# Patient Record
Sex: MALE | Race: BLACK OR AFRICAN AMERICAN | Employment: STUDENT | URBAN - METROPOLITAN AREA
[De-identification: names, ages, dates, MRNs, and addresses within clinical notes are randomized per-mention and may not be internally consistent; named-entity substitution may affect disease eponyms.]

---
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
## 2021-03-10 ENCOUNTER — TELEPHONE (OUTPATIENT)
Dept: FAMILY MEDICINE CLINIC | Facility: CLINIC | Age: 21
End: 2021-03-10

## 2021-03-10 NOTE — TELEPHONE ENCOUNTER
Please call the patient and schedule a routine physical/health maintenance visit  The patient's last physical was 9/2019

## 2021-03-11 ENCOUNTER — TELEPHONE (OUTPATIENT)
Dept: FAMILY MEDICINE CLINIC | Facility: CLINIC | Age: 21
End: 2021-03-11

## 2023-05-23 ENCOUNTER — HOSPITAL ENCOUNTER (EMERGENCY)
Facility: HOSPITAL | Age: 23
Discharge: HOME | End: 2023-05-24
Attending: EMERGENCY MEDICINE

## 2023-05-23 DIAGNOSIS — Z91.14 NON COMPLIANCE W MEDICATION REGIMEN: ICD-10-CM

## 2023-05-23 DIAGNOSIS — F29 PSYCHOSIS (HCC): ICD-10-CM

## 2023-05-23 DIAGNOSIS — R45.1 AGITATION: Primary | ICD-10-CM

## 2023-05-23 RX ORDER — BENZTROPINE MESYLATE 1 MG/1
1 TABLET ORAL ONCE
Status: DISCONTINUED | OUTPATIENT
Start: 2023-05-24 | End: 2023-05-23

## 2023-05-23 RX ORDER — HALOPERIDOL 5 MG/ML
10 INJECTION INTRAMUSCULAR ONCE
Status: COMPLETED | OUTPATIENT
Start: 2023-05-24 | End: 2023-05-23

## 2023-05-23 RX ORDER — LORAZEPAM 2 MG/ML
2 INJECTION INTRAMUSCULAR ONCE
Status: COMPLETED | OUTPATIENT
Start: 2023-05-24 | End: 2023-05-23

## 2023-05-23 RX ORDER — HALOPERIDOL 5 MG/1
5 TABLET ORAL ONCE
Status: DISCONTINUED | OUTPATIENT
Start: 2023-05-24 | End: 2023-05-23

## 2023-05-23 RX ORDER — BENZTROPINE MESYLATE 1 MG/ML
2 INJECTION INTRAMUSCULAR; INTRAVENOUS ONCE
Status: COMPLETED | OUTPATIENT
Start: 2023-05-24 | End: 2023-05-23

## 2023-05-23 RX ORDER — LORAZEPAM 1 MG/1
2 TABLET ORAL ONCE
Status: DISCONTINUED | OUTPATIENT
Start: 2023-05-24 | End: 2023-05-23

## 2023-05-23 RX ADMIN — LORAZEPAM 2 MG: 2 INJECTION INTRAMUSCULAR; INTRAVENOUS at 23:58

## 2023-05-23 RX ADMIN — BENZTROPINE MESYLATE 2 MG: 1 INJECTION INTRAMUSCULAR; INTRAVENOUS at 23:58

## 2023-05-23 RX ADMIN — HALOPERIDOL LACTATE 10 MG: 5 INJECTION, SOLUTION INTRAMUSCULAR at 23:58

## 2023-05-24 VITALS
DIASTOLIC BLOOD PRESSURE: 66 MMHG | OXYGEN SATURATION: 98 % | BODY MASS INDEX: 22.87 KG/M2 | WEIGHT: 203 LBS | RESPIRATION RATE: 16 BRPM | TEMPERATURE: 98.9 F | HEART RATE: 72 BPM | SYSTOLIC BLOOD PRESSURE: 121 MMHG

## 2023-05-24 NOTE — ED PROVIDER NOTES
"History  Chief Complaint   Patient presents with   • Psychiatric Evaluation     Patient reports history of schizophrenia  States that he was recently released from senior care and is seeking psychiatric evaluation to have medication regulated  Denies SI, HI, AH and VH  20-year-old gentleman presents for psychiatric evaluation  He reports that he just got out of senior care and was supposed to go to a psychiatric facility  He states that he has a history of schizophrenia and schizoaffective disorder and has been off of his medicine for \"a long time\"  He states that while in senior care he was on meds but they were not effective  He denies any thoughts of self-harm or suicide but does appear to be suffering from hallucinations  Patient is not able to focus on a conversation and is easily distracted  He becomes somewhat agitated during questioning        Psychiatric Evaluation  Presenting symptoms: bizarre behavior, delusional, disorganized speech, disorganized thought process, hallucinations and paranoid behavior    Presenting symptoms: no suicidal thoughts    Degree of incapacity (severity):  Severe  Onset quality:  Gradual  Timing:  Constant  Progression:  Worsening  Chronicity:  Recurrent  Context: noncompliance    Treatment compliance:  Untreated  Relieved by:  Nothing  Worsened by:  Nothing  Ineffective treatments:  None tried  Associated symptoms: irritability and poor judgment        None       Past Medical History:   Diagnosis Date   • Acne     Last assessed 8/14/2014    • Anemia    • Anxiety    • Asthma     Last assessed 8/14/2014    • Attention deficit hyperactivity disorder     Last assessed 8/14/2014    • Depression     Last assessed 8/14/2014    • Enuresis, nocturnal only     Last assessed 8/14/2014    • Mood swings    • Oppositional defiant disorder, mild     Last assessed 8/14/2014        Past Surgical History:   Procedure Laterality Date   • NO PAST SURGERIES         Family History   Problem Relation Age of " Onset   • No Known Problems Mother    • Cancer Other      I have reviewed and agree with the history as documented  E-Cigarette/Vaping     E-Cigarette/Vaping Substances     Social History     Tobacco Use   • Smoking status: Every Day     Types: Cigarettes   • Smokeless tobacco: Never   Substance Use Topics   • Alcohol use: Yes   • Drug use: Yes     Types: Marijuana       Review of Systems   Constitutional: Positive for irritability  Psychiatric/Behavioral: Positive for hallucinations and paranoia  Negative for suicidal ideas  All other systems reviewed and are negative  Physical Exam  Physical Exam  Vitals and nursing note reviewed  Constitutional:       General: He is not in acute distress  Appearance: Normal appearance  He is well-developed  He is not ill-appearing, toxic-appearing or diaphoretic  HENT:      Head: Normocephalic and atraumatic  Right Ear: External ear normal       Left Ear: External ear normal       Nose: Nose normal       Mouth/Throat:      Mouth: Mucous membranes are moist       Pharynx: Oropharynx is clear  Eyes:      Conjunctiva/sclera: Conjunctivae normal       Pupils: Pupils are equal, round, and reactive to light  Cardiovascular:      Rate and Rhythm: Normal rate and regular rhythm  Heart sounds: Normal heart sounds  Pulmonary:      Effort: Pulmonary effort is normal  No respiratory distress  Breath sounds: Normal breath sounds  Abdominal:      General: Bowel sounds are normal  There is no distension  Palpations: Abdomen is soft  Tenderness: There is no abdominal tenderness  There is no guarding  Musculoskeletal:         General: Normal range of motion  Cervical back: Neck supple  No rigidity  Right lower leg: No edema  Left lower leg: No edema  Skin:     General: Skin is warm and dry  Capillary Refill: Capillary refill takes less than 2 seconds  Neurological:      General: No focal deficit present        Mental Status: He is alert and oriented to person, place, and time  Psychiatric:         Mood and Affect: Mood normal  Affect is inappropriate  Speech: Speech is delayed  Behavior: Behavior is agitated  Behavior is cooperative  Thought Content: Thought content does not include homicidal or suicidal ideation  Judgment: Judgment is impulsive and inappropriate  Vital Signs  ED Triage Vitals [05/23/23 2347]   Temperature Pulse Respirations Blood Pressure SpO2   98 9 °F (37 2 °C) (!) 138 18 139/80 97 %      Temp Source Heart Rate Source Patient Position - Orthostatic VS BP Location FiO2 (%)   Tympanic Monitor Sitting Left arm --      Pain Score       No Pain           Vitals:    05/23/23 2347 05/24/23 0327   BP: 139/80 121/66   Pulse: (!) 138 72   Patient Position - Orthostatic VS: Sitting Lying         Visual Acuity      ED Medications  Medications   haloperidol lactate (HALDOL) injection 10 mg (10 mg Intramuscular Given 5/23/23 2358)   LORazepam (ATIVAN) injection 2 mg (2 mg Intramuscular Given 5/23/23 2358)   benztropine (COGENTIN) injection 2 mg (2 mg Intramuscular Given 5/23/23 2358)       Diagnostic Studies  Results Reviewed     Procedure Component Value Units Date/Time    Rapid drug screen, urine [[id number]]     Lab Status: No result Specimen: Urine     POCT alcohol breath test [[id number]]     Lab Status: No result                  No orders to display              Procedures  Procedures         ED Course  ED Course as of 05/24/23 0548   Tue May 23, 2023   2358 Patient has become increasingly agitated  He is pacing around the room, shadow boxing, doing crunches, and attempting to ride on the litter like a surfboard  Patient was offered p o  medications which she refused  Security was notified and IM medications were administered  We will continue to monitor closely                                 SBIRT 20yo+    Flowsheet Row Most Recent Value   Initial Alcohol Screen: US AUDIT-C     1  How often do you have a drink containing alcohol? 0 Filed at: 05/24/2023 0216   2  How many drinks containing alcohol do you have on a typical day you are drinking? 0 Filed at: 05/24/2023 0216   3a  Male UNDER 65: How often do you have five or more drinks on one occasion? 0 Filed at: 05/24/2023 0216   3b  FEMALE Any Age, or MALE 65+: How often do you have 4 or more drinks on one occassion? 0 Filed at: 05/24/2023 0216   Audit-C Score 0 Filed at: 05/24/2023 9819   NELLY: How many times in the past year have you    Used an illegal drug or used a prescription medication for non-medical reasons? Never Filed at: 05/24/2023 0216                    Medical Decision Making  21 old gentleman presents with complaint of needing to be admitted psychiatrically  He states he has a history of schizophrenia and is not on his medications  He is unable to provide a clear and appropriate history  He has not been compliant with his medications and in the department began to have escalating behavior placing himself and staff members at risk  Behavior improved after medications are administered  I discussed the case with the crisis worker  Because of the patient's somnolence after receiving medications, he was unable to be assessed during the overnight shift  Will sign out to the day team to assess and follow-up  Agitation: acute illness or injury  Non compliance w medication regimen: chronic illness or injury  Amount and/or Complexity of Data Reviewed  Independent Historian: EMS  Labs: ordered  Risk  Prescription drug management  Decision regarding hospitalization            Disposition  Final diagnoses:   Agitation   Psychosis ([facility] [address] )   Non compliance w medication regimen     Time reflects when diagnosis was documented in both MDM as applicable and the Disposition within this note     Time User Action Codes Description Comment    5/24/2023  5:46 AM Zita Camejo Add [R45 1] Agitation     5/24/2023  5:46 AM Traci Villareal Add [F29] Psychosis ([facility] [address] )     5/24/2023  5:46 AM Traci Villareal Add [W87 262] Non compliance w medication regimen       ED Disposition     ED Disposition   Transfer to [address] [place] [address]   --    Date/Time   Tue May 23, 2023 11:48 PM    Comment   Zeinab Nevarez should be transferred out to behavioral health and has been medically cleared  Follow-up Information    None         Patient's Medications    No medications on file       No discharge procedures on file      PDMP Review     None          ED Provider  Electronically Signed by           Kwasi Baca DO  05/24/23 6041

## 2023-05-24 NOTE — ED NOTES
Pt sleeping, no distress noted  Respirations even/unlabored    1:1 obs continues for safety     Cecy Larios RN  05/24/23 0700

## 2023-05-24 NOTE — ED CARE HANDOFF
Emergency Department Sign Out Note        Sign out and transfer of care from Dr Deborah Cortez  See Separate Emergency Department note  The patient, Patrizia Hinojosa, was evaluated by the previous provider for Dr Deborah Cortez  Workup Completed:  Medically cleared    ED Course / Workup Pending (followup): After crisis eval, will discharge                                     Procedures  MDM        Disposition  Final diagnoses:   Agitation   Psychosis ([address] )   Non compliance w medication regimen     Time reflects when diagnosis was documented in both MDM as applicable and the Disposition within this note     Time User Action Codes Description Comment    5/24/2023  5:46 AM Amando Romberg Add [R45 1] Agitation     5/24/2023  5:46 AM Amando Romberg Add [F29] Psychosis ([address] )     5/24/2023  5:46 AM Amando Romberg Add [Z91 148] Non compliance w medication regimen       ED Disposition     ED Disposition   Discharge    Condition   Stable    Date/Time   Wed May 24, 2023  8:00 AM    Comment   Patrizia Hinojosa will be discharged         Follow-up Information     Follow up With Specialties Details Why [id number] , MD Family Medicine   [facility]  [address] [place] [address] [address] [facility]   Monday Tuesday Thursday 9:00 a m  to 11:30 a  m  Wednesday 9:00 a m  to 11:30 a m  and 1:00 p m  to 3:00 p m  Phone number [phone]  To be seen during walk-in hours bring for identification as well as social security card or adressed mail [facility]  [phone]        Patient's Medications    No medications on file     No discharge procedures on file         ED Provider  Electronically Signed by     Cindy Archuleta MD  05/24/23 0960

## 2023-05-24 NOTE — ED NOTES
Patient is awake and alert  Patient reported a history of schizophrenia  EMR indicates a history of depression, PTSD and substance use  He has had extended periods of noncompliance with treatment and medication  He stated he was treated during a recent incarceration and that the medication did not work  Therefore, he requested a medication adjustment  He later stated he would like to leave  Patient denied SI and HI  He denied auditory and visual hallucinations  He did present as guarded and paranoid  Criteria for a 302 were not met  Patient was encouraged to return if his symptoms became worse  He was also encouraged to utilize the walk-in hours for [address] and Referral, as his current [facility] is managed by [facility]  He will need to decide if he is going to remain in [facility] [place], and if so, will need to have his insurance transferred from [place]

## 2023-05-24 NOTE — ED NOTES
All belongings returned to pt upon discharge, paperwork signed by pt     Velvet Gtz, KARRI  05/24/23 0605

## 2023-05-24 NOTE — ED NOTES
[place] PROMISe indicates: Active  Recipient #[id number]  [facility] managed by [facility]  IV intact

## 2023-07-20 ENCOUNTER — INPATIENT (INPATIENT)
Facility: HOSPITAL | Age: 23
LOS: 18 days | Discharge: NURSING, REHAB OR LONG-TERM CARE | DRG: 885 | End: 2023-08-08
Attending: PSYCHIATRY & NEUROLOGY | Admitting: PSYCHIATRY & NEUROLOGY
Payer: COMMERCIAL

## 2023-07-20 VITALS
HEIGHT: 72 IN | WEIGHT: 199.96 LBS | RESPIRATION RATE: 16 BRPM | SYSTOLIC BLOOD PRESSURE: 116 MMHG | HEART RATE: 75 BPM | DIASTOLIC BLOOD PRESSURE: 80 MMHG | TEMPERATURE: 98 F | OXYGEN SATURATION: 99 %

## 2023-07-20 DIAGNOSIS — F29 UNSPECIFIED PSYCHOSIS NOT DUE TO A SUBSTANCE OR KNOWN PHYSIOLOGICAL CONDITION: ICD-10-CM

## 2023-07-20 LAB
ANION GAP SERPL CALC-SCNC: 15 MMOL/L — SIGNIFICANT CHANGE UP (ref 5–17)
APAP SERPL-MCNC: <5 UG/ML — LOW (ref 10–30)
BASOPHILS # BLD AUTO: 0.04 K/UL — SIGNIFICANT CHANGE UP (ref 0–0.2)
BASOPHILS NFR BLD AUTO: 0.4 % — SIGNIFICANT CHANGE UP (ref 0–2)
BUN SERPL-MCNC: 15 MG/DL — SIGNIFICANT CHANGE UP (ref 7–23)
CALCIUM SERPL-MCNC: 9.6 MG/DL — SIGNIFICANT CHANGE UP (ref 8.4–10.5)
CHLORIDE SERPL-SCNC: 102 MMOL/L — SIGNIFICANT CHANGE UP (ref 96–108)
CO2 SERPL-SCNC: 21 MMOL/L — LOW (ref 22–31)
CREAT SERPL-MCNC: 1.06 MG/DL — SIGNIFICANT CHANGE UP (ref 0.5–1.3)
EGFR: 101 ML/MIN/1.73M2 — SIGNIFICANT CHANGE UP
EOSINOPHIL # BLD AUTO: 0.04 K/UL — SIGNIFICANT CHANGE UP (ref 0–0.5)
EOSINOPHIL NFR BLD AUTO: 0.4 % — SIGNIFICANT CHANGE UP (ref 0–6)
ETHANOL SERPL-MCNC: <10 MG/DL — SIGNIFICANT CHANGE UP (ref 0–10)
GLUCOSE SERPL-MCNC: 83 MG/DL — SIGNIFICANT CHANGE UP (ref 70–99)
HCT VFR BLD CALC: 45.3 % — SIGNIFICANT CHANGE UP (ref 39–50)
HGB BLD-MCNC: 15.4 G/DL — SIGNIFICANT CHANGE UP (ref 13–17)
IMM GRANULOCYTES NFR BLD AUTO: 0.3 % — SIGNIFICANT CHANGE UP (ref 0–0.9)
LYMPHOCYTES # BLD AUTO: 1.66 K/UL — SIGNIFICANT CHANGE UP (ref 1–3.3)
LYMPHOCYTES # BLD AUTO: 17.2 % — SIGNIFICANT CHANGE UP (ref 13–44)
MCHC RBC-ENTMCNC: 27.1 PG — SIGNIFICANT CHANGE UP (ref 27–34)
MCHC RBC-ENTMCNC: 34 GM/DL — SIGNIFICANT CHANGE UP (ref 32–36)
MCV RBC AUTO: 79.6 FL — LOW (ref 80–100)
MONOCYTES # BLD AUTO: 0.85 K/UL — SIGNIFICANT CHANGE UP (ref 0–0.9)
MONOCYTES NFR BLD AUTO: 8.8 % — SIGNIFICANT CHANGE UP (ref 2–14)
NEUTROPHILS # BLD AUTO: 7.03 K/UL — SIGNIFICANT CHANGE UP (ref 1.8–7.4)
NEUTROPHILS NFR BLD AUTO: 72.9 % — SIGNIFICANT CHANGE UP (ref 43–77)
NRBC # BLD: 0 /100 WBCS — SIGNIFICANT CHANGE UP (ref 0–0)
PLATELET # BLD AUTO: 222 K/UL — SIGNIFICANT CHANGE UP (ref 150–400)
POTASSIUM SERPL-MCNC: 4.3 MMOL/L — SIGNIFICANT CHANGE UP (ref 3.5–5.3)
POTASSIUM SERPL-SCNC: 4.3 MMOL/L — SIGNIFICANT CHANGE UP (ref 3.5–5.3)
RBC # BLD: 5.69 M/UL — SIGNIFICANT CHANGE UP (ref 4.2–5.8)
RBC # FLD: 14.4 % — SIGNIFICANT CHANGE UP (ref 10.3–14.5)
SALICYLATES SERPL-MCNC: <0.3 MG/DL — LOW (ref 2.8–20)
SARS-COV-2 RNA SPEC QL NAA+PROBE: SIGNIFICANT CHANGE UP
SODIUM SERPL-SCNC: 138 MMOL/L — SIGNIFICANT CHANGE UP (ref 135–145)
WBC # BLD: 9.65 K/UL — SIGNIFICANT CHANGE UP (ref 3.8–10.5)
WBC # FLD AUTO: 9.65 K/UL — SIGNIFICANT CHANGE UP (ref 3.8–10.5)

## 2023-07-20 PROCEDURE — 99285 EMERGENCY DEPT VISIT HI MDM: CPT

## 2023-07-20 RX ORDER — HALOPERIDOL DECANOATE 100 MG/ML
5 INJECTION INTRAMUSCULAR ONCE
Refills: 0 | Status: COMPLETED | OUTPATIENT
Start: 2023-07-20 | End: 2023-07-20

## 2023-07-20 RX ORDER — OLANZAPINE 15 MG/1
5 TABLET, FILM COATED ORAL ONCE
Refills: 0 | Status: COMPLETED | OUTPATIENT
Start: 2023-07-20 | End: 2023-07-20

## 2023-07-20 RX ORDER — DIPHENHYDRAMINE HCL 50 MG
50 CAPSULE ORAL EVERY 6 HOURS
Refills: 0 | Status: DISCONTINUED | OUTPATIENT
Start: 2023-07-20 | End: 2023-08-06

## 2023-07-20 RX ORDER — OLANZAPINE 15 MG/1
5 TABLET, FILM COATED ORAL ONCE
Refills: 0 | Status: DISCONTINUED | OUTPATIENT
Start: 2023-07-20 | End: 2023-07-20

## 2023-07-20 RX ORDER — OLANZAPINE 15 MG/1
5 TABLET, FILM COATED ORAL DAILY
Refills: 0 | Status: DISCONTINUED | OUTPATIENT
Start: 2023-07-20 | End: 2023-07-26

## 2023-07-20 RX ORDER — TRAZODONE HCL 50 MG
50 TABLET ORAL AT BEDTIME
Refills: 0 | Status: DISCONTINUED | OUTPATIENT
Start: 2023-07-20 | End: 2023-08-08

## 2023-07-20 RX ORDER — HYDROXYZINE HCL 10 MG
50 TABLET ORAL EVERY 6 HOURS
Refills: 0 | Status: DISCONTINUED | OUTPATIENT
Start: 2023-07-20 | End: 2023-08-08

## 2023-07-20 RX ORDER — HALOPERIDOL DECANOATE 100 MG/ML
5 INJECTION INTRAMUSCULAR EVERY 8 HOURS
Refills: 0 | Status: DISCONTINUED | OUTPATIENT
Start: 2023-07-20 | End: 2023-07-21

## 2023-07-20 RX ORDER — MAGNESIUM HYDROXIDE 400 MG/1
30 TABLET, CHEWABLE ORAL DAILY
Refills: 0 | Status: DISCONTINUED | OUTPATIENT
Start: 2023-07-20 | End: 2023-08-08

## 2023-07-20 RX ORDER — ACETAMINOPHEN 500 MG
650 TABLET ORAL EVERY 6 HOURS
Refills: 0 | Status: DISCONTINUED | OUTPATIENT
Start: 2023-07-20 | End: 2023-08-08

## 2023-07-20 RX ORDER — OLANZAPINE 15 MG/1
5 TABLET, FILM COATED ORAL EVERY 6 HOURS
Refills: 0 | Status: DISCONTINUED | OUTPATIENT
Start: 2023-07-20 | End: 2023-07-21

## 2023-07-20 RX ADMIN — Medication 50 MILLIGRAM(S): at 15:19

## 2023-07-20 RX ADMIN — Medication 2 MILLIGRAM(S): at 15:18

## 2023-07-20 RX ADMIN — HALOPERIDOL DECANOATE 5 MILLIGRAM(S): 100 INJECTION INTRAMUSCULAR at 15:18

## 2023-07-20 RX ADMIN — OLANZAPINE 5 MILLIGRAM(S): 15 TABLET, FILM COATED ORAL at 12:03

## 2023-07-20 NOTE — ED BEHAVIORAL HEALTH ASSESSMENT NOTE - NSBHMSEPERCEPT_PSY_A_CORE
patient clearly responding to internal stimuli in the form of auditory hallucinations patient clearly responding to internal stimuli in the form of auditory hallucinations/Auditory hallucinations

## 2023-07-20 NOTE — ED ADULT TRIAGE NOTE - CHIEF COMPLAINT QUOTE
independent
Pt presents to ED C/O SI. States, " I'm not going to lie I've been going through it, I've had suicidal thought since I was a kid but it's been getting worse, everyone who is going through something has these thoughts I want to see a psychiatrist, I'm not taking prescribed medication". Pt has hx of schizophrenia. Pt aggressive, non cooperative in triage, states " I want to leave you gonna find me dead in a casket". Code gray called. Security and ANM at bedside. Denies Alchohol/ drug use. 1:1 in place. Pt wanded.

## 2023-07-20 NOTE — ED BEHAVIORAL HEALTH ASSESSMENT NOTE - VIOLENCE RISK FACTORS:
Feeling of being under threat and being unable to control threat Feeling of being under threat and being unable to control threat/Impulsivity/Irritability

## 2023-07-20 NOTE — ED BEHAVIORAL HEALTH ASSESSMENT NOTE - NSBHMSEHYG_PSY_A_CORE
Time: 9:34 PM EDT  Date of encounter:  5/17/2023  Independent Historian/Clinical History and Information was obtained by:   Patient and Family  Chief Complaint: Psych evaluation    History is limited by: N/A    History of Present Illness:  Patient is a 42 y.o. year old male who presents to the emergency department for evaluation of depression that patient feels is worsening.  Mom states patient has feeling more depressed and is likely having a exacerbation of his major depression and wants to talk to somebody.  Patient is not suicidal or homicidal.        HPI    Patient Care Team  Primary Care Provider: Hien Parham MD    Past Medical History:     Allergies   Allergen Reactions   • Valproic Acid Irritability     DEPAKOTE CAUSES DIZZINESS AND IRRITABILITY       Past Medical History:   Diagnosis Date   • Acute upper respiratory infection 02/2022   • Age-related osteoporosis without current pathological fracture    • Broken toe 11/2022    left great toe   • Cerebral palsy     diagnosed as infant   • Compression fracture of first lumbar vertebra    • Constipation    • Disease of pancreas    • History of urinary self-catheterization     pt or pt's mother catheterizes 4-6  times a day   • Hydrocephalus in diseases classified elsewhere    • Injury of back 02/2020   • Low back pain    • Major depressive disorder     single episode, moderate   • Other abnormal glucose    • Other cerebral palsy    • Other fatigue    • Other generalized epilepsy and epileptic syndromes, not intractable, without status epilepticus    • Other hypertrophic disorders of the skin    • Pain in left ankle and joints of left foot    • Pain in right toe(s)    • Pain in thoracic spine    • Paranoid schizophrenia    • Pelvic and perineal pain    • PONV (postoperative nausea and vomiting)    • Primary insomnia    • Pure hypercholesterolemia    • Repeated falls     last fall 11/2022   • Schizoaffective disorder, unspecified    • Seizures     last  seizure was approx 2018, medication controlled   • Sleep apnea     uses cpap machine   • Somnolence    • TIA (transient ischemic attack)    • Vitamin D deficiency    • Wedge compression fracture of fourth thoracic vertebra, subsequent encounter for fracture with routine healing      Past Surgical History:   Procedure Laterality Date   • CYST REMOVAL Right 11/11/2022    Procedure: Excision of subcutaneous mass from right forearm;  Surgeon: Oni Greenberg MD;  Location: [facility] OR [facility] [place];  Service: General;  Laterality: Right;   • HAMSTRING REPAIR Bilateral     age 10   • OTHER SURGICAL HISTORY      hyposadius repair   • TOE FUSION Right     great toe at age 21     Family History   Problem Relation Age of Onset   • Hypothyroidism Mother    • No Known Problems Father    • Ulcers Brother    • Malig Hyperthermia Neg Hx        Home Medications:  Prior to Admission medications    Medication Sig Start Date End Date Taking? Authorizing Provider   alclometasone (ACLOVATE) 0.05 % cream  3/15/23   Francisca Woodard MD   ascorbic acid (VITAMIN C) 1000 MG tablet Take 1 tablet by mouth Daily.    Francisca Woodard MD   aspirin 81 MG EC tablet Take 1 tablet by mouth Daily.    Francisca Woodard MD   benzonatate (TESSALON) 200 MG capsule Take 1 capsule by mouth 3 (Three) Times a Day As Needed for Cough. 3/20/23   Pepe Gray APRN   busPIRone (BUSPAR) 10 MG tablet Take 1 tablet by mouth 3 (Three) Times a Day As Needed. 6/2/21   Francisca Woodard MD   Calcium Carbonate 1500 (600 Ca) MG tablet Take 1 tablet by mouth 2 (two) times a day. 6/2/21   Francisca Woodard MD   cholecalciferol 25 MCG tablet TAKE 2 TABLETS BY MOUTH DAILY. 3/27/23   Hien Parham MD   ciprofloxacin (Cipro) 500 MG tablet Take 1 tablet by mouth 2 (Two) Times a Day. 4/4/23   Landy Arthur APRN   cyclobenzaprine (FLEXERIL) 5 MG tablet Take 1 tablet by mouth 3 (Three) Times a Day As Needed for Muscle Spasms. 7/26/22 7/26/23   Hien Parham MD   Diclofenac Sodium (VOLTAREN) 1 % gel gel Apply 1 g topically to the appropriate area as directed As Needed. 6/2/21   Francisca Woodard MD   docusate sodium (COLACE) 100 MG capsule TAKE 1 CAPSULE BY MOUTH DAILY. 1/30/23   Hien Parham MD   famotidine (PEPCID) 20 MG tablet TAKE 1 TABLET BY MOUTH DAILY. 1/5/23   Hien Parham MD   folic acid (FOLVITE) 800 MCG tablet Take 1 tablet by mouth Daily.    Francisca Woodard MD   HM TRUEplus Fiber 2 g chewable tablet Chew 1 tablet Daily. 7/26/22   Hien Parham MD   Invega Sustenna 234 MG/1.5ML suspension prefilled syringe IM injection Inject 1.5 mL into the appropriate muscle as directed by prescriber Every 30 (Thirty) Days. 5/13/21   Francisca Woodard MD   Keppra 500 MG tablet Take 3 tablets by mouth Every 12 (Twelve) Hours As Needed. 6/2/21   Francisca Woodard MD   ketoconazole (NIZORAL) 2 % shampoo  3/9/23   Francisca Woodard MD   lamoTRIgine (LaMICtal) 200 MG tablet Take 1 tablet by mouth Daily. 6/2/21   Francisca Woodard MD   Melatonin 10 MG tablet Take 1 tablet by mouth every night at bedtime. 4/4/23   Hien Parham MD   NON FORMULARY CPAP    Francisca Woodard MD   pravastatin (PRAVACHOL) 20 MG tablet TAKE 1 TABLET BY MOUTH EVERY NIGHT. 3/1/23   Hien Parham MD   venlafaxine XR (EFFEXOR-XR) 150 MG 24 hr capsule  10/10/22   Francisca Woodard MD   venlafaxine XR (EFFEXOR-XR) 37.5 MG 24 hr capsule  2/28/23   Francisca Woodard MD        Social History:   Social History     Tobacco Use   • Smoking status: Never   • Smokeless tobacco: Never   Vaping Use   • Vaping Use: Never used   Substance Use Topics   • Alcohol use: Never   • Drug use: Never         Review of Systems:  Review of Systems   Constitutional: Negative for chills and fever.   HENT: Negative for congestion, ear pain and sore throat.    Eyes: Negative for pain.   Respiratory: Negative for cough, chest tightness and  "shortness of breath.    Cardiovascular: Negative for chest pain.   Gastrointestinal: Negative for abdominal pain, diarrhea, nausea and vomiting.   Genitourinary: Negative for flank pain and hematuria.        Patient self caths due to neurogenic bladder   Musculoskeletal: Negative for joint swelling.   Skin: Negative for pallor.   Neurological: Negative for seizures and headaches.   Psychiatric/Behavioral: Positive for dysphoric mood. Negative for hallucinations and suicidal ideas.   All other systems reviewed and are negative.       Physical Exam:  /93   Pulse 92   Temp 98.4 °F (36.9 °C) (Oral)   Resp 16   Ht 172.7 cm (68\")   Wt 78.5 kg (173 lb 1 oz)   SpO2 100%   BMI 26.31 kg/m²     Physical Exam  Vitals and nursing note reviewed.   Constitutional:       General: He is not in acute distress.     Appearance: Normal appearance. He is not toxic-appearing.   HENT:      Head: Normocephalic and atraumatic.      Nose: Nose normal.      Mouth/Throat:      Mouth: Mucous membranes are moist.   Eyes:      General: No scleral icterus.     Conjunctiva/sclera: Conjunctivae normal.   Cardiovascular:      Rate and Rhythm: Normal rate and regular rhythm.      Pulses: Normal pulses.      Heart sounds: Normal heart sounds.   Pulmonary:      Effort: Pulmonary effort is normal. No respiratory distress.      Breath sounds: Normal breath sounds.   Abdominal:      General: Bowel sounds are normal.      Palpations: Abdomen is soft.      Tenderness: There is no abdominal tenderness. There is no right CVA tenderness or left CVA tenderness.   Musculoskeletal:         General: Normal range of motion.      Cervical back: Normal range of motion and neck supple.   Skin:     General: Skin is warm and dry.   Neurological:      Mental Status: He is alert and oriented to person, place, and time. Mental status is at baseline.   Psychiatric:      Comments: Patient reports depression that he feels is worsening.  He does not identify a " source of what is making him feel worse.    Patient has no hallucinations.  Patient has no suicidal or homicidal ideation          Procedures:  Procedures      Medical Decision Making:      Comorbidities that affect care:    Patient has a history of depression, cerebral palsy, neurogenic bladder, schizoaffective disorder and paranoid schizophrenia    External Notes reviewed:    Previous Clinic Note: Patient last seen in the office on April 4 for abnormal urine color.  Was diagnosed with groin swelling      The following orders were placed and all results were independently analyzed by me:  Orders Placed This Encounter   Procedures   • [place] Draw   • Ethanol   • Urine Drug Screen - Urine, Clean Catch   • Comprehensive Metabolic Panel   • Acetaminophen Level   • Salicylate Level   • CBC Auto Differential   • Vital Signs   • Continuous Pulse Oximetry   • Urology (on-call MD unless specified)   • Green Top (Gel)   • Lavender Top   • Gold Top - SST   • Light Blue Top   • CBC & Differential       Medications Given in the Emergency Department:  Medications - No data to display     ED Course:         Labs:    Lab Results (last 24 hours)     ** No results found for the last 24 hours. **           Imaging:    No Radiology Exams Resulted Within Past 24 Hours      Differential Diagnosis and Discussion:    Psychiatric: Differential diagnosis includes but is not limited to depression, psychosis, bipolar disorder, anxiety, manic episode, schizophrenia, and substance abuse.    All labs were reviewed and interpreted by me.    MDM  Number of Diagnoses or Management Options  Severe episode of recurrent major depressive disorder, with psychotic features  Diagnosis management comments: Patient was seen and evaluated by Desean gibson after his medical work-up was complete and he was medically cleared.  Patient is not suicidal or homicidal and has been accepted to the adult crisis stabilization unit.  His mother will drive him  over there.  Patient's vital signs have been stable.  Patient has been advised to follow-up with Dr. Morris for his neurogenic bladder and inability to self cath recently       Amount and/or Complexity of Data Reviewed  Clinical lab tests: reviewed and ordered  Decide to obtain previous medical records or to obtain history from someone other than the patient: yes  Obtain history from someone other than the patient: yes (Mother)  Review and summarize past medical records: yes (Previous clinic notes were reviewed)  Discuss the patient with other providers: yes (Desean from [facility]    Dr. Torres the on-call urologist)    Risk of Complications, Morbidity, and/or Mortality  Presenting problems: low  Diagnostic procedures: low  Management options: low    Patient Progress  Patient progress: stable         Patient Care Considerations:    PSYCH: I considered ordering anxiolytic and or antipsychotic medications, however patient was able to facilitate the medical screening exam and disposition without further medications.      Consultants/Shared Management Plan:    I have discussed the case with Desean the licensed mental health evaluator from [facility] who states the patient can be sent over to the adult crisis stabilization unit for admission  Consultant: I have discussed the case with Alisson Moore the on-call urologist who states She will come in and cath the patient since we are unable to pass the catheter to get a urine for medical clearance    Social Determinants of Health:    Patient has presented with family members who are responsible, reliable and will ensure follow up care.      Disposition and Care Coordination:    Discharged to Rehab/Psychiatric facility    I have explained the patient´s condition, diagnoses and treatment plan based on the information available to me at this time. I have answered questions and addressed any concerns. The patient has a good  understanding of the patient´s diagnosis,  condition, and treatment plan as can be expected at this point. The vital signs have been stable. The patient´s condition is stable and appropriate for discharge from the emergency department.      The patient will pursue further outpatient evaluation with the primary care physician or other designated or consulting physician as outlined in the discharge instructions. They are agreeable to this plan of care and follow-up instructions have been explained in detail. The patient has received these instructions in written format and have expressed an understanding of the discharge instructions. The patient is aware that any significant change in condition or worsening of symptoms should prompt an immediate return to this or the closest emergency department or call to 911.  I have explained discharge medications and the need for follow up with the patient/caretakers. This was also printed in the discharge instructions. Patient was discharged with the following medications and follow up:      Medication List      No changes were made to your prescriptions during this visit.      ADULT CRISIS STABILIZATION UNIT  [address]  [facility] [address]  [phone]  Today      Adalid Morris MD  [address] [facility] [address]  [phone]    Schedule an appointment as soon as possible for a visit          Final diagnoses:   Severe episode of recurrent major depressive disorder, with psychotic features   Neurogenic bladder        ED Disposition     ED Disposition   Discharge    Condition   Stable    Comment   --             This medical record created using voice recognition software.           Lori Don, ÁNGELA  05/19/23 [id number]     Poor

## 2023-07-20 NOTE — ED ADULT NURSE NOTE - CHIEF COMPLAINT QUOTE
Pt presents to ED C/O SI. States, " I'm not going to lie I've been going through it, I've had suicidal thought since I was a kid but it's been getting worse, everyone who is going through something has these thoughts I want to see a psychiatrist, I'm not taking prescribed medication". Pt has hx of schizophrenia. Pt aggressive, non cooperative in triage, states " I want to leave you gonna find me dead in a casket". Code gray called. Security and ANM at bedside. Denies Alchohol/ drug use. 1:1 in place. Pt wanded.

## 2023-07-20 NOTE — ED BEHAVIORAL HEALTH ASSESSMENT NOTE - NSBHSUBSTUSED_PSY_A_CORE
Cannabis Island Pedicle Flap-Requiring Vessel Identification Text: The defect edges were debeveled with a #15 scalpel blade.  Given the location of the defect, shape of the defect and the proximity to free margins an island pedicle advancement flap was deemed most appropriate.  Using a sterile surgical marker, an appropriate advancement flap was drawn, based on the axial vessel mentioned above, incorporating the defect, outlining the appropriate donor tissue and placing the expected incisions within the relaxed skin tension lines where possible.    The area thus outlined was incised deep to adipose tissue with a #15 scalpel blade.  The skin margins were undermined to an appropriate distance in all directions around the primary defect and laterally outward around the island pedicle utilizing iris scissors.  There was minimal undermining beneath the pedicle flap.

## 2023-07-20 NOTE — ED ADULT NURSE NOTE - OBJECTIVE STATEMENT
[de-identified] : 
23y Male A&ox3 to ED c/o SI. Pt uncooperative to assessment. Denies alcohol/ drug use/ pain. Endorses plan but refuses to anser remainder of assessment questions. Pt refusing to wear yellow hospital gown. Wonded by security. Nurse Manager made aware. Environment checked for safety using checklist. On constant observation.

## 2023-07-20 NOTE — ED BEHAVIORAL HEALTH ASSESSMENT NOTE - DETAILS
handoff completed to [facility] nurse BIBS patient initially reported suicidal ideation to ED team, but was not able to give clear answer during interview

## 2023-07-20 NOTE — ED ADULT NURSE NOTE - NSFALLUNIVINTERV_ED_ALL_ED
Bed/Stretcher in lowest position, wheels locked, appropriate side rails in place/Call bell, personal items and telephone in reach/Instruct patient to call for assistance before getting out of bed/chair/stretcher/Non-slip footwear applied when patient is off stretcher/[place] to call system/Physically safe environment - no spills, clutter or unnecessary equipment/Purposeful proactive rounding/Room/bathroom lighting operational, light cord in reach
20-Dec-2021 21:11

## 2023-07-20 NOTE — BH PATIENT PROFILE - FUNCTIONAL ASSESSMENT - BASIC MOBILITY SCORE.
Discharge instruction given to patient  NO questions or concerns at this time  Patient able to ambulate out without difficulty        Patrick Rodgers RN  01/30/19 [id number]

## 2023-07-20 NOTE — ED BEHAVIORAL HEALTH ASSESSMENT NOTE - NSBHATTESTCOMMENTATTENDFT_PSY_A_CORE
22yo man with a history of schizophrenia, cannabis use, multiple reported past psychiatric admissions, no prior [facility] visits, who presents acutely psychotic, with disorganized thinking, agitation and vague suicidal thinking in the setting of unclear substance use, unclear adherence with outpatient treatment. Pt endorsed suicidal ideation in triage with aggression toward staff and statement that “ I want to leave you gonna find me dead in a casket"; did not further describe SI on ED attending evaluation.    On exam in ED with security present, pt presents disheveled, with poor eye contact, psychomotor agitation, frequently rocking back and forth and appearing to respond to internal stimuli. Pt able to state his location as “some hospital”, unable to state date or reason for presentation; does mention “I did some stupid shit yesterday”. Mention of ideas of reference to other providers. Pt otherwise unable to provide coherent history, nods inconsistently yes and no when asked about SI. Insight and judgment poor.    Impression is of acute psychosis, unclear if substance induced. At moderate acute risk of harm to self and others and grossly unable to care for self in the community due to disorganized state.    Recommend: 1:1 observation for safety. Olanzapine 5mg po/IM Q6H PRN for psychosis and agitation. Plan for 9.27 admission to [facility]. [facility] when able to obtain. Collateral as able. D/w ED attending

## 2023-07-20 NOTE — ED BEHAVIORAL HEALTH ASSESSMENT NOTE - PAST PSYCHOTROPIC MEDICATION
Patient reports past meds including: risperdal, lithium, klonopin, zyprexa, concerta. Unable to clarify what his current medication regimen is.

## 2023-07-20 NOTE — ED ADULT NURSE REASSESSMENT NOTE - NS ED NURSE REASSESS COMMENT FT1
pt awake and alert lying in stretcher. Refusing to anser questions at this time. Pending Urine collection.

## 2023-07-20 NOTE — ED PROVIDER NOTE - PROGRESS NOTE DETAILS
Pt eval by psych who request zyprexa and want to admit pt; pt unable to take po, given im w good sedation.  Pt medically clear.   Pt tba to psych.

## 2023-07-20 NOTE — ED BEHAVIORAL HEALTH ASSESSMENT NOTE - ADDITIONAL DETAILS ALL
unable to obtain suicide history due to on-going psychosis unable to obtain suicide history due to on-going psychosis. Initially reported SI when presented to ED

## 2023-07-20 NOTE — ED PROVIDER NOTE - PHYSICAL EXAMINATION
VITAL SIGNS: I have reviewed nursing notes and confirm.  CONSTITUTIONAL:  in no acute distress.   SKIN:  warm and dry, no acute rash.   HEAD:  normocephalic, atraumatic.  EYES: PERRL, EOM intact; conjunctiva and sclera clear.  ENT: No nasal discharge; airway clear.   NECK: Supple; non tender.  CARD: S1, S2 normal; no murmurs, gallops, or rubs. Regular rate and rhythm.   RESP:  Clear to auscultation b/l, no wheezes, rales or rhonchi.  ABD: Normal bowel sounds; soft; non-distended; non-tender; no guarding/ rebound.  MSK: Normal ROM. No clubbing, cyanosis or edema. no ttp bilat le  NEURO: Alert, oriented, grossly unremarkable  PSYCH: currently calm, seems to be responding to internal stimuli, poor eye contact, slow to answer questions
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
## 2023-07-20 NOTE — ED PROVIDER NOTE - CLINICAL SUMMARY MEDICAL DECISION MAKING FREE TEXT BOX
Pt c/o depression, si, also h/o schizophrenia and seems to be responding to internal stimuli.  Pt placed on 1:1.  No physical complaints.  Plan psych clearance eval, psych consult; reassess. See progress notes for further mdm related documentation.

## 2023-07-20 NOTE — ED BEHAVIORAL HEALTH ASSESSMENT NOTE - HPI (INCLUDE ILLNESS QUALITY, SEVERITY, DURATION, TIMING, CONTEXT, MODIFYING FACTORS, ASSOCIATED SIGNS AND SYMPTOMS)
Tank Centeno is a 22 yo -American man, undomiciled, no pertinent PMH, PPH of schizophrenia, PTSD (per [facility]), pt reports multiple IPP hospitalizations in [place], last IPP at [facility] 1/27/23-2/09/23, unclear suicide attempt history, history of cannabis use, BIBS for suicidal ideation. Psychiatry consulted for SI and possible psychosis.    Patient came to ED and endorsed suicidal ideation but was not forthcoming with details and did not endorse a specific plan. Code grey called due to patient becoming agitated when asked to change into hospital gown. Upon approach, patient is sitting in hallway chair in ED comfortably. Patient is alert and engages with interviewer. Patient is minimally cooperative and answers few questions appropriately. Patient noticeable internally preoccupied during interview, shaking his head intermittently, thought blocking present when asked questions. Patient makes reference that "I know everyone can hear God" while keeping his head down and nodding up and down. Limited interview due to patient's disorganized behavior and internal preoccupation. Patient endorses PPH of schizophrenia, can not clearly identify current medication regimen, does not identify any contacts that provider can call for collateral.

## 2023-07-20 NOTE — ED BEHAVIORAL HEALTH ASSESSMENT NOTE - SUMMARY
RECOMMENDATIONS  -1:1 observation for agitation and disorganization  -plan for 2PC psychiatric admission if medically cleared - f/u pending labs and utox  -olanzapine 5mg po/IM Q6H PRN for acute agitation  -collateral as able  -d/w ED attending Tank Centeno is a 24 yo -American man, undomiciled, no pertinent PMH, PPH of schizophrenia, PTSD (per PSYCKES), pt reports multiple IPP hospitalizations in [place], last IPP at [place] 1/27/23-2/09/23, unclear suicide attempt history, history of cannabis use, BIBS for suicidal ideation. Psychiatry consulted for SI and possible psychosis.    RECOMMENDATIONS  -1:1 observation for agitation and disorganization  -plan for 2PC psychiatric admission if medically cleared - f/u pending labs and utox  -olanzapine 5mg po/IM Q6H PRN for acute agitation  -collateral as able  -d/w ED attending    #Severe agitation/aggression  -For severe agitation not responding to behavioral intervention, may give zyprexa 5 mg po q6h prn, ativan 2 mg po q6h prn, Benadryl 50 mg po q6h prn.  -If patient refusing PO and remains an imminent danger to self or others, can give IM formulation of haldol 5mg q6h, ativan 2mg q6h, benadryl 50mg q6h.   -Please DO NOT give IM formulation of zyprexa and ativan as this can lead to excessive oversedation and respiratory depression.  -If IM antipsychotic is administered, please perform follow-up ECG for QTc monitoring (<500). Tank Centeno is a 24 yo -American man, undomiciled, no pertinent PMH, PPH of schizophrenia, PTSD (per PSYCKES), pt reports multiple IPP hospitalizations in [place], last IPP at [place] 1/27/23-2/09/23, unclear suicide attempt history, history of cannabis use, BIBS for suicidal ideation. Psychiatry consulted for SI and possible psychosis.    Patient responding to internal stimuli, disorganized thought process and speech, thought blocking present during interview. Had previously endorsed suicidal ideation to ED team. Appears patient is experiencing auditory hallucinations from God. Unclear if these are CAH. One episode of agitation in ED with code grey called. Patient mentioned "partying" recently, so there is possibility current presentation is substance induced vs due to primary psychotic disorder. Patient will benefit from inpatient psychiatric hospitalization for further medication management and stabilization. Patient is NOT psychiatry cleared for discharge at this time.    RECOMMENDATIONS  -1:1 observation for agitation and disorganization  -plan for 2PC psychiatric admission if medically cleared - f/u pending labs and utox  -olanzapine 5mg po/IM Q6H PRN for acute agitation  -collateral as able  -d/w ED attending    #Severe agitation/aggression  -For severe agitation not responding to behavioral intervention, may give zyprexa 5 mg po q6h prn, ativan 2 mg po q6h prn, Benadryl 50 mg po q6h prn.  -If patient refusing PO and remains an imminent danger to self or others, can give IM formulation of haldol 5mg q6h, ativan 2mg q6h, benadryl 50mg q6h.   -Please DO NOT give IM formulation of zyprexa and ativan as this can lead to excessive oversedation and respiratory depression.  -If IM antipsychotic is administered, please perform follow-up ECG for QTc monitoring (<500).

## 2023-07-20 NOTE — ED BEHAVIORAL HEALTH ASSESSMENT NOTE - DESCRIPTION
code grey called in ED when patient asked to change into gown, was able to be verbally de-escalated. was offered PO Zyprexa but refused, given IM formulation for ongoing psychosis and agitation unclear currently undomiciled, reports was in [place] and has been in [place] for a couple of months, has cousins here but is not close with them

## 2023-07-20 NOTE — BH CHART NOTE - NSEVENTNOTEFT_PSY_ALL_CORE
Tank Centeno is a 24 yo -American man, undomiciled, no pertinent PMH, PPH of schizophrenia, PTSD (per PSYCKES), pt reports multiple IPP hospitalizations in [place], last IPP at [place] 1/27/23-2/09/23, unclear suicide attempt history, history of cannabis use, BIBS for suicidal ideation    On arrival to unit, pt noted to be internally preoccupied, observed talking to himself, nodding head and shaking his head randomly at times. He is intense and focused on discharged. Unable to get much information from pt other than he brought himself to the hospital. Medications were offered to pt, IM or PO, he requested po and was given haldol 5mg/Ativan 2mg/Benadryl 50mg which he took with out issue.
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
## 2023-07-20 NOTE — ED PROVIDER NOTE - OBJECTIVE STATEMENT
22 yo M h/o schizophrenia c/o depression w SI.  Pt states he has thoughts/plan but does not want to share w me.  Pt states he's been admitted before but does not share details.  Pt denies psych med use.  Pt denies AH/VH/HI.  Pt denies physical complaint.  Pt denies drugs, etoh.

## 2023-07-20 NOTE — ED BEHAVIORAL HEALTH ASSESSMENT NOTE - OTHER
acutely psychotic, unable to care for self unknown seeking care in hospital initially. otherwise unknown

## 2023-07-20 NOTE — ED BEHAVIORAL HEALTH ASSESSMENT NOTE - RISK ASSESSMENT
Modifiable risk factors include active psychosis, psychiatric illness, lack of social support/living alone/single, possible unemployment.  Static risk factors include male gender  Protective factors include seeking out treatment Modifiable risk factors include active psychosis, psychiatric illness, lack of social support/living alone/single, possible unemployment, potential recnet substance use  Static risk factors include male gender, h/o schizophrenia  Protective factors include seeking out treatment

## 2023-07-21 DIAGNOSIS — F32.9 MAJOR DEPRESSIVE DISORDER, SINGLE EPISODE, UNSPECIFIED: ICD-10-CM

## 2023-07-21 DIAGNOSIS — F20.0 PARANOID SCHIZOPHRENIA: ICD-10-CM

## 2023-07-21 DIAGNOSIS — F39 UNSPECIFIED MOOD [AFFECTIVE] DISORDER: ICD-10-CM

## 2023-07-21 DIAGNOSIS — F43.10 POST-TRAUMATIC STRESS DISORDER, UNSPECIFIED: ICD-10-CM

## 2023-07-21 PROCEDURE — 99223 1ST HOSP IP/OBS HIGH 75: CPT

## 2023-07-21 RX ORDER — HALOPERIDOL DECANOATE 100 MG/ML
5 INJECTION INTRAMUSCULAR EVERY 6 HOURS
Refills: 0 | Status: DISCONTINUED | OUTPATIENT
Start: 2023-07-21 | End: 2023-08-05

## 2023-07-21 RX ADMIN — HALOPERIDOL DECANOATE 5 MILLIGRAM(S): 100 INJECTION INTRAMUSCULAR at 15:13

## 2023-07-21 RX ADMIN — Medication 50 MILLIGRAM(S): at 15:13

## 2023-07-21 RX ADMIN — Medication 2 MILLIGRAM(S): at 15:13

## 2023-07-21 NOTE — BH INPATIENT PSYCHIATRY ASSESSMENT NOTE - ADDITIONAL DETAILS ALL
He notes multiple suicide attempts, but does not know when or how many. States that he tried at least once when he was a child and at least once when he was an adult

## 2023-07-21 NOTE — BH INPATIENT PSYCHIATRY ASSESSMENT NOTE - DESCRIPTION
currently undomiciled, reports was in [place] and has been in [place] for a couple of months, has cousins here but is not close with them

## 2023-07-21 NOTE — BH INPATIENT PSYCHIATRY ASSESSMENT NOTE - CURRENT MEDICATION
MEDICATIONS  (STANDING):  OLANZapine 5 milliGRAM(s) Oral daily    MEDICATIONS  (PRN):  acetaminophen     Tablet .. 650 milliGRAM(s) Oral every 6 hours PRN Temp greater or equal to 38C (100.4F), Mild Pain (1 - 3)  aluminum hydroxide/magnesium hydroxide/simethicone Suspension 30 milliLiter(s) Oral every 4 hours PRN Dyspepsia  diphenhydrAMINE 50 milliGRAM(s) Oral every 6 hours PRN agitation  haloperidol     Tablet 5 milliGRAM(s) Oral every 8 hours PRN agitation  hydrOXYzine hydrochloride 50 milliGRAM(s) Oral every 6 hours PRN Anxiety  LORazepam     Tablet 2 milliGRAM(s) Oral every 6 hours PRN Agitation  magnesium hydroxide Suspension 30 milliLiter(s) Oral daily PRN Constipation  OLANZapine 5 milliGRAM(s) Oral every 6 hours PRN agitation  traZODone 50 milliGRAM(s) Oral at bedtime PRN insomnia

## 2023-07-21 NOTE — BH INPATIENT PSYCHIATRY ASSESSMENT NOTE - RISK ASSESSMENT
Protective factors include seeking out treatment  Modifiable risk factors include SI, active agitation/psychosis, psychiatric illness, impulsivity, hopelessness, recent psychosocial stressors, substance use/intoxication, lack of social support/living alone/single, access to means, unemployment.  Static risk factors include male gender, prior SA.

## 2023-07-21 NOTE — BH INPATIENT PSYCHIATRY ASSESSMENT NOTE - OTHER PAST PSYCHIATRIC HISTORY (INCLUDE DETAILS REGARDING ONSET, COURSE OF ILLNESS, INPATIENT/OUTPATIENT TREATMENT)
per psyckes, schizophrenia and PTSD; patient reports schizophrenia per psyckes, schizophrenia and PTSD; patient reports major depression

## 2023-07-21 NOTE — BH INPATIENT PSYCHIATRY ASSESSMENT NOTE - DETAILS
Patient stated he had thoughts of killing himself by "taking a bunch of pills", does not know the medication or the amount. States now that he's good and wasn't going to act on it.  History of acne and tremors with unknown psychotropic medications alluded to trauma but could not provide details

## 2023-07-21 NOTE — BH INPATIENT PSYCHIATRY ASSESSMENT NOTE - NSICDXBHTERTIARYDX_PSY_ALL_CORE
R/O Substance-induced psychotic disorder with hallucinations   F19.951  R/O Schizoaffective psychosis   F25.9  R/O Mood disorder with psychosis   F39

## 2023-07-21 NOTE — BH INPATIENT PSYCHIATRY ASSESSMENT NOTE - NSBHMSEMOVE_PSY_A_CORE
patient shaking his head at times, keeps head down/Tics/Tremors/Dystonia/Fasciculations patient shaking his head at times, keeps head down/Tics/Tremors

## 2023-07-21 NOTE — BH INPATIENT PSYCHIATRY ASSESSMENT NOTE - NSBHMETABOLIC_PSY_ALL_CORE_FT
BMI: BMI (kg/m2): 27.1 (07-20-23 @ 09:50)  HbA1c:   Glucose:   BP: 117/75 (07-20-23 @ 14:17) (116/80 - 117/75)  Lipid Panel:

## 2023-07-21 NOTE — BH INPATIENT PSYCHIATRY ASSESSMENT NOTE - NSBHATTESTAPPBILLTIME_PSY_A_CORE
I attest my time as ILENE is greater than 50% of the total combined time spent on qualifying patient care activities. I have reviewed and verified the documentation.

## 2023-07-21 NOTE — BH INPATIENT PSYCHIATRY ASSESSMENT NOTE - NSBHCONSDANGERSELF_PSY_A_CORE
suicidal behavior/suicidal ideation with plan and means/unable to care for self suicidal ideation with plan and means/unable to care for self

## 2023-07-21 NOTE — BH INPATIENT PSYCHIATRY ASSESSMENT NOTE - OTHER
Denies, but is responding to internal stimuli. Seen laughing and speaking to himself   States he is fine, will not expand
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
## 2023-07-21 NOTE — BH INPATIENT PSYCHIATRY ASSESSMENT NOTE - NSCOMMENTSUICRISKFACT_PSY_ALL_CORE
Patient notes history of mood disorder, hx of shizophrenia and PTSD per ED. Pt denies being on medication, and is unable to say when he was diagnosed, what medications he has used, and to expand on any of his diagnosis.

## 2023-07-21 NOTE — BH INPATIENT PSYCHIATRY ASSESSMENT NOTE - NSBHCHARTREVIEWVS_PSY_A_CORE FT
Vital Signs Last 24 Hrs  T(C): 36.7 (07-20-23 @ 14:17), Max: 36.7 (07-20-23 @ 14:17)  T(F): 98.1 (07-20-23 @ 14:17), Max: 98.1 (07-20-23 @ 14:17)  HR: 75 (07-20-23 @ 14:17) (75 - 75)  BP: 117/75 (07-20-23 @ 14:17) (117/75 - 117/75)  BP(mean): --  RR: 17 (07-20-23 @ 14:17) (17 - 17)  SpO2: 100% (07-20-23 @ 14:17) (100% - 100%)     Vital Signs Last 24 Hrs  T(C): --  T(F): --  HR: --  BP: --  BP(mean): --  RR: --  SpO2: --

## 2023-07-21 NOTE — BH INPATIENT PSYCHIATRY ASSESSMENT NOTE - NSBHCONSIPREASONDETAILS_PSY_A_CORE FT
Planned to take large amount of pills in an attempt to commit suicide, is acutely psychotic responding to internal stimuli

## 2023-07-21 NOTE — BH SOCIAL WORK INITIAL PSYCHOSOCIAL EVALUATION - OTHER PAST PSYCHIATRIC HISTORY (INCLUDE DETAILS REGARDING ONSET, COURSE OF ILLNESS, INPATIENT/OUTPATIENT TREATMENT)
Writer met with pt. Pt guarded and not forthcoming with information. PT presented as paranoid, internally preoccupied and disorganized.

## 2023-07-21 NOTE — BH INPATIENT PSYCHIATRY ASSESSMENT NOTE - NSBHASSESSSUMMFT_PSY_ALL_CORE
24 yo M PMH schizophrenia, PTSD, Mood disorder presenting for suicidal ideations with a plan and means. States that it was only in the moment and that he no longer feels the desire to take his own life. Is responding to internal stimuli laughing and talking to himself. Is denying SI/HI/AVH and PI. Makes large repeititve movements with neck and arms while not responding to outside stimuli. Seeking medication and therapy at this time. Plan to continue Zyprexa 5mg for psychosis, PRN ativan 2mg, benadryl 50mg, and haldol 5mg for agitation. 22 yo M PMH schizophrenia, PTSD, Mood disorder presenting for suicidal ideations with a plan and means. States that it was only in the moment and that he no longer feels the desire to take his own life. Is responding to internal stimuli laughing and talking to himself. Is denying SI/HI/AVH and PI. Makes large repetitive movements with neck and arms while not responding to outside stimuli. Seeking medication and therapy at this time. Plan to continue Zyprexa 5mg for psychosis, PRN ativan 2mg, benadryl 50mg, and haldol 5mg for agitation.

## 2023-07-21 NOTE — BH INPATIENT PSYCHIATRY ASSESSMENT NOTE - PAST PSYCHOTROPIC MEDICATION
Seroquel 200 mg, unknown effects  Lithium unknown dose and effect  States there are others, but he is unable to remember the names, stating there were side effects such as acne and tremors

## 2023-07-21 NOTE — BH INPATIENT PSYCHIATRY ASSESSMENT NOTE - NSICDXBHSECONDARYDX_PSY_ALL_CORE
Paranoid schizophrenia   F20.0  MDD (major depressive disorder)   F32.9  Mood disorder   F39   Psychosis, unspecified psychosis type   F29  MDD (major depressive disorder)   F32.9  Mood disorder   F39  Paranoid schizophrenia   F20.0  Post traumatic stress disorder (PTSD)   F43.10

## 2023-07-22 RX ORDER — HALOPERIDOL DECANOATE 100 MG/ML
5 INJECTION INTRAMUSCULAR ONCE
Refills: 0 | Status: COMPLETED | OUTPATIENT
Start: 2023-07-22 | End: 2023-07-22

## 2023-07-22 RX ORDER — DIPHENHYDRAMINE HCL 50 MG
50 CAPSULE ORAL ONCE
Refills: 0 | Status: COMPLETED | OUTPATIENT
Start: 2023-07-22 | End: 2023-07-22

## 2023-07-22 RX ADMIN — HALOPERIDOL DECANOATE 5 MILLIGRAM(S): 100 INJECTION INTRAMUSCULAR at 14:34

## 2023-07-22 RX ADMIN — Medication 50 MILLIGRAM(S): at 14:34

## 2023-07-22 RX ADMIN — Medication 4 MILLIGRAM(S): at 14:34

## 2023-07-22 RX ADMIN — HALOPERIDOL DECANOATE 5 MILLIGRAM(S): 100 INJECTION INTRAMUSCULAR at 11:20

## 2023-07-22 RX ADMIN — OLANZAPINE 5 MILLIGRAM(S): 15 TABLET, FILM COATED ORAL at 11:20

## 2023-07-22 RX ADMIN — Medication 2 MILLIGRAM(S): at 11:18

## 2023-07-22 NOTE — BH CHART NOTE - NSEVENTNOTEFT_PSY_ALL_CORE
Called by nursing regarding pt aggressive, banging on nursing station window, increasingly aggressive.  Pt seen and evaluated at bedside, attempted verbal deescalation and offered po medications which pt refused. Became increasingly agitated, states "I'm going to fuck you up, "I'm going to fuck that white doctor up, I'm going to fuck up all the staff".  Due to increasing agitation and danger to others Haldol 5mg/Ativan 4mg/Benadryl 50mg IM ONCE ordered.  Administered in security and nursing presence with brief manual hold required. Called by nursing regarding pt aggressive, banging on nursing station window, increasingly aggressive.  Pt seen and evaluated at bedside, attempted verbal deescalation and offered po medications which pt refused. Became increasingly agitated, states "I'm going to fuck you up, "I'm going to fuck that white doctor up, I'm going to fuck up all the staff".  Multiple attempts at de-escalation attempted with protracted stand off.  Due to increasing agitation and danger to others Haldol 5mg/Ativan 4mg/Benadryl 50mg IM ONCE ordered.  Administered in security and nursing presence with brief manual hold required.
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
## 2023-07-23 RX ORDER — DIPHENHYDRAMINE HCL 50 MG
50 CAPSULE ORAL ONCE
Refills: 0 | Status: COMPLETED | OUTPATIENT
Start: 2023-07-23 | End: 2023-07-23

## 2023-07-23 RX ORDER — HALOPERIDOL DECANOATE 100 MG/ML
5 INJECTION INTRAMUSCULAR ONCE
Refills: 0 | Status: COMPLETED | OUTPATIENT
Start: 2023-07-23 | End: 2023-07-23

## 2023-07-23 RX ADMIN — HALOPERIDOL DECANOATE 5 MILLIGRAM(S): 100 INJECTION INTRAMUSCULAR at 09:07

## 2023-07-23 RX ADMIN — Medication 4 MILLIGRAM(S): at 09:07

## 2023-07-23 RX ADMIN — Medication 50 MILLIGRAM(S): at 09:07
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
## 2023-07-24 PROCEDURE — 99232 SBSQ HOSP IP/OBS MODERATE 35: CPT

## 2023-07-24 NOTE — BH INPATIENT PSYCHIATRY PROGRESS NOTE - NSBHMETABOLIC_PSY_ALL_CORE_FT
BMI: BMI (kg/m2): 27.1 (07-20-23 @ 09:50)  HbA1c:   Glucose:   BP: 123/80 (07-24-23 @ 10:09) (123/80 - 131/81)  Lipid Panel:

## 2023-07-24 NOTE — BH INPATIENT PSYCHIATRY PROGRESS NOTE - NSBHCHARTREVIEWVS_PSY_A_CORE FT
Vital Signs Last 24 Hrs  T(C): 36.8 (07-24-23 @ 10:09), Max: 36.8 (07-24-23 @ 10:09)  T(F): 98.2 (07-24-23 @ 10:09), Max: 98.2 (07-24-23 @ 10:09)  HR: 73 (07-24-23 @ 10:09) (73 - 73)  BP: 123/80 (07-24-23 @ 10:09) (123/80 - 123/80)  BP(mean): --  RR: 18 (07-24-23 @ 10:09) (18 - 18)  SpO2: 99% (07-24-23 @ 10:09) (99% - 99%)

## 2023-07-24 NOTE — BH INPATIENT PSYCHIATRY PROGRESS NOTE - NSBHASSESSSUMMFT_PSY_ALL_CORE
22 yo M PMH schizophrenia, PTSD, Mood disorder presenting for suicidal ideations with a plan and means. States that it was only in the moment and that he no longer feels the desire to take his own life. Is responding to internal stimuli laughing and talking to himself. Is denying SI/HI/AVH and PI. Makes large repetitive movements with neck and arms while not responding to outside stimuli. Seeking medication and therapy at this time. Plan to continue Zyprexa 5mg for psychosis, PRN ativan 2mg, benadryl 50mg, and haldol 5mg for agitation.

## 2023-07-24 NOTE — BH INPATIENT PSYCHIATRY PROGRESS NOTE - PRN MEDS
MEDICATIONS  (PRN):  acetaminophen     Tablet .. 650 milliGRAM(s) Oral every 6 hours PRN Temp greater or equal to 38C (100.4F), Mild Pain (1 - 3)  aluminum hydroxide/magnesium hydroxide/simethicone Suspension 30 milliLiter(s) Oral every 4 hours PRN Dyspepsia  diphenhydrAMINE 50 milliGRAM(s) Oral every 6 hours PRN agitation  haloperidol     Tablet 5 milliGRAM(s) Oral every 6 hours PRN agitation  hydrOXYzine hydrochloride 50 milliGRAM(s) Oral every 6 hours PRN Anxiety  LORazepam     Tablet 2 milliGRAM(s) Oral every 6 hours PRN Agitation  magnesium hydroxide Suspension 30 milliLiter(s) Oral daily PRN Constipation  traZODone 50 milliGRAM(s) Oral at bedtime PRN insomnia
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
## 2023-07-24 NOTE — BH INPATIENT PSYCHIATRY PROGRESS NOTE - OTHER
States he is fine, will not expand  Denies, but is responding to internal stimuli. Seen laughing and speaking to himself

## 2023-07-25 PROCEDURE — 99233 SBSQ HOSP IP/OBS HIGH 50: CPT

## 2023-07-25 RX ORDER — ARIPIPRAZOLE 15 MG/1
5 TABLET ORAL DAILY
Refills: 0 | Status: DISCONTINUED | OUTPATIENT
Start: 2023-07-25 | End: 2023-07-27

## 2023-07-25 RX ADMIN — HALOPERIDOL DECANOATE 5 MILLIGRAM(S): 100 INJECTION INTRAMUSCULAR at 21:44

## 2023-07-25 RX ADMIN — Medication 2 MILLIGRAM(S): at 21:44

## 2023-07-25 RX ADMIN — Medication 50 MILLIGRAM(S): at 21:44

## 2023-07-25 NOTE — BH INPATIENT PSYCHIATRY PROGRESS NOTE - NSBHCHARTREVIEWVS_PSY_A_CORE FT
Writer in to bladder scan patient per Dr. Brenda Lerner order. Patient was in the bathroom to urinate at this time.       Sherrie Clay RN  07/23/20 6304 Vital Signs Last 24 Hrs  T(C): 36.8 (07-25-23 @ 09:10), Max: 36.8 (07-24-23 @ 16:10)  T(F): 98.2 (07-25-23 @ 09:10), Max: 98.3 (07-24-23 @ 16:10)  HR: 77 (07-25-23 @ 09:10) (76 - 77)  BP: 110/80 (07-25-23 @ 09:10) (110/80 - 123/73)  BP(mean): --  RR: 18 (07-25-23 @ 09:10) (18 - 18)  SpO2: 97% (07-25-23 @ 09:10) (97% - 99%)

## 2023-07-25 NOTE — BH INPATIENT PSYCHIATRY PROGRESS NOTE - NSBHMETABOLIC_PSY_ALL_CORE_FT
BMI: BMI (kg/m2): 27.1 (07-20-23 @ 09:50)  HbA1c:   Glucose:   BP: 110/80 (07-25-23 @ 09:10) (110/80 - 123/80)  Lipid Panel:

## 2023-07-25 NOTE — BH INPATIENT PSYCHIATRY PROGRESS NOTE - CURRENT MEDICATION
MEDICATIONS  (STANDING):  ARIPiprazole 5 milliGRAM(s) Oral daily  OLANZapine 5 milliGRAM(s) Oral daily    MEDICATIONS  (PRN):  acetaminophen     Tablet .. 650 milliGRAM(s) Oral every 6 hours PRN Temp greater or equal to 38C (100.4F), Mild Pain (1 - 3)  aluminum hydroxide/magnesium hydroxide/simethicone Suspension 30 milliLiter(s) Oral every 4 hours PRN Dyspepsia  diphenhydrAMINE 50 milliGRAM(s) Oral every 6 hours PRN agitation  haloperidol     Tablet 5 milliGRAM(s) Oral every 6 hours PRN agitation  hydrOXYzine hydrochloride 50 milliGRAM(s) Oral every 6 hours PRN Anxiety  LORazepam     Tablet 2 milliGRAM(s) Oral every 6 hours PRN Agitation  magnesium hydroxide Suspension 30 milliLiter(s) Oral daily PRN Constipation  traZODone 50 milliGRAM(s) Oral at bedtime PRN insomnia

## 2023-07-25 NOTE — BH INPATIENT PSYCHIATRY PROGRESS NOTE - NSBHASSESSSUMMFT_PSY_ALL_CORE
24 yo M PMH schizophrenia, PTSD, Mood disorder presenting for suicidal ideations with a plan and means. States that it was only in the moment and that he no longer feels the desire to take his own life. Is responding to internal stimuli laughing and talking to himself. Is denying SI/HI/AVH and PI. Makes large repetitive movements with neck and arms while not responding to outside stimuli. Seeking medication and therapy at this time. Plan to continue Zyprexa 5mg for psychosis, PRN ativan 2mg, benadryl 50mg, and haldol 5mg for agitation.    Abilify 5mg qd    7/25 Patient noted to be better related, continues to express feelings of fear, paranoia and AH. Is willing to start abilify 5mg

## 2023-07-25 NOTE — BH INPATIENT PSYCHIATRY PROGRESS NOTE - NSBHMSEPERCEPT_PSY_A_CORE
patient clearly responding to internal stimuli in the form of auditory hallucinations/Auditory hallucinations/Other

## 2023-07-26 PROCEDURE — 99232 SBSQ HOSP IP/OBS MODERATE 35: CPT

## 2023-07-26 RX ADMIN — OLANZAPINE 5 MILLIGRAM(S): 15 TABLET, FILM COATED ORAL at 10:37

## 2023-07-26 RX ADMIN — ARIPIPRAZOLE 5 MILLIGRAM(S): 15 TABLET ORAL at 10:36

## 2023-07-26 RX ADMIN — Medication 50 MILLIGRAM(S): at 21:39

## 2023-07-26 NOTE — BH INPATIENT PSYCHIATRY PROGRESS NOTE - NSBHASSESSSUMMFT_PSY_ALL_CORE
22 yo M PMH schizophrenia, PTSD, Mood disorder presenting for suicidal ideations with a plan and means. States that it was only in the moment and that he no longer feels the desire to take his own life. Is responding to internal stimuli laughing and talking to himself. Is denying SI/HI/AVH and PI. Makes large repetitive movements with neck and arms while not responding to outside stimuli. Seeking medication and therapy at this time. Plan to continue Zyprexa 5mg for psychosis, PRN ativan 2mg, benadryl 50mg, and haldol 5mg for agitation.    Abilify 5mg qd    7/25 Patient noted to be better related, continues to express feelings of fear, paranoia and AH. Is willing to start abilify 5mg   7/26 Patient reflective on circumstances, expressing sadness related to psychosocial stressors and trauma, is taking abilify, may be open to antidepressant

## 2023-07-26 NOTE — BH INPATIENT PSYCHIATRY PROGRESS NOTE - NSBHCHARTREVIEWVS_PSY_A_CORE FT
Vital Signs Last 24 Hrs  T(C): 36.7 (07-26-23 @ 09:20), Max: 36.7 (07-25-23 @ 16:17)  T(F): 98.1 (07-26-23 @ 09:20), Max: 98.1 (07-26-23 @ 09:20)  HR: 64 (07-26-23 @ 09:20) (64 - 69)  BP: 107/68 (07-26-23 @ 09:20) (107/68 - 145/81)  BP(mean): --  RR: 18 (07-26-23 @ 09:20) (18 - 18)  SpO2: 100% (07-26-23 @ 09:20) (98% - 100%)

## 2023-07-26 NOTE — BH INPATIENT PSYCHIATRY PROGRESS NOTE - NSBHMETABOLIC_PSY_ALL_CORE_FT
BMI: BMI (kg/m2): 27.1 (07-20-23 @ 09:50)  HbA1c:   Glucose:   BP: 107/68 (07-26-23 @ 09:20) (107/68 - 145/81)  Lipid Panel:

## 2023-07-26 NOTE — BH INPATIENT PSYCHIATRY PROGRESS NOTE - NSBHFUPINTERVALHXFT_PSY_A_CORE
Patient visible on the unit this morning, noted to be more physically expressive, twirling around and observed doing ninja kicks. Is responsive to staff verbal redirection and states that he is 'bored.' On approach by writer he reports continued willingness to take medications as we discussed yesterday- abilify. May be open to antidepressant as he reported feeling depressed at times due to lack of support, hx of growing up in group homes and bad decisions. He is hopeful that things will get better and shares strong desire to work and make friends. He remains uncertain if he wants to return to [place] or stay in [place]. Has reservations about returning to [place] as their may be a warrant out for his arrest due to him not maintaining contact with his .   Still responding to internal stimuli as evidenced by him shaking his head, nodding to self at times, but this is less so than prior interactions.  MAR reviewed, pt received po prns of haldol 5mg/ativan 2mg/benadryl 50mg last night. States that he had poor sleep due to 'weird dreams.' No acute medical concerns.

## 2023-07-26 NOTE — BH INPATIENT PSYCHIATRY PROGRESS NOTE - CURRENT MEDICATION
[facility],   [address] [place] [id number]  Phone: [phone]  Fax: (   )    -  Follow Up Time: 1 week    [facility],   Phone: [phone]  Fax: (   )    -  Follow Up Time: 1 week Tomás Barriga)  Vascular Surgery  [facility], [address] [place] [address]  Phone: [phone]  Fax: [phone]  Follow Up Time: 2 weeks    [facility],   [address] [place] [address]  Phone: [phone]  Fax: (   )    -  Follow Up Time: 1 week    [facility],   Phone: [phone]  Fax: (   )    -  Follow Up Time: 1 week    Vignesh Sheehan (BRENDA)  Podiatric Medicine and Surgery  [address] [place] [address]  Phone: [phone]  Fax: [phone]  Follow Up Time: 1 week MEDICATIONS  (STANDING):  ARIPiprazole 5 milliGRAM(s) Oral daily    MEDICATIONS  (PRN):  acetaminophen     Tablet .. 650 milliGRAM(s) Oral every 6 hours PRN Temp greater or equal to 38C (100.4F), Mild Pain (1 - 3)  aluminum hydroxide/magnesium hydroxide/simethicone Suspension 30 milliLiter(s) Oral every 4 hours PRN Dyspepsia  diphenhydrAMINE 50 milliGRAM(s) Oral every 6 hours PRN agitation  haloperidol     Tablet 5 milliGRAM(s) Oral every 6 hours PRN agitation  hydrOXYzine hydrochloride 50 milliGRAM(s) Oral every 6 hours PRN Anxiety  LORazepam     Tablet 2 milliGRAM(s) Oral every 6 hours PRN Agitation  magnesium hydroxide Suspension 30 milliLiter(s) Oral daily PRN Constipation  traZODone 50 milliGRAM(s) Oral at bedtime PRN insomnia

## 2023-07-26 NOTE — BH INPATIENT PSYCHIATRY PROGRESS NOTE - NSBHMSEPERCEPT_PSY_A_CORE
No patient clearly responding to internal stimuli in the form of auditory hallucinations/Auditory hallucinations/Other

## 2023-07-27 PROCEDURE — 99233 SBSQ HOSP IP/OBS HIGH 50: CPT

## 2023-07-27 RX ORDER — ARIPIPRAZOLE 15 MG/1
10 TABLET ORAL DAILY
Refills: 0 | Status: DISCONTINUED | OUTPATIENT
Start: 2023-07-27 | End: 2023-08-01

## 2023-07-27 RX ADMIN — ARIPIPRAZOLE 5 MILLIGRAM(S): 15 TABLET ORAL at 10:24

## 2023-07-27 NOTE — BH INPATIENT PSYCHIATRY PROGRESS NOTE - NSBHASSESSSUMMFT_PSY_ALL_CORE
24 yo M PMH schizophrenia, PTSD, Mood disorder presenting for suicidal ideations with a plan and means. States that it was only in the moment and that he no longer feels the desire to take his own life. Is responding to internal stimuli laughing and talking to himself. Is denying SI/HI/AVH and PI. Makes large repetitive movements with neck and arms while not responding to outside stimuli. Seeking medication and therapy at this time. Plan to continue Zyprexa 5mg for psychosis, PRN ativan 2mg, benadryl 50mg, and haldol 5mg for agitation.    Abilify 10mg qd    7/25 Patient noted to be better related, continues to express feelings of fear, paranoia and AH. Is willing to start abilify 5mg   7/26 Patient reflective on circumstances, expressing sadness related to psychosocial stressors and trauma, is taking abilify, may be open to antidepressant  7/27 Will plan to further increase abilify to 10mg qd to address delusions and paranoid ideation

## 2023-07-27 NOTE — BH INPATIENT PSYCHIATRY PROGRESS NOTE - CURRENT MEDICATION
MEDICATIONS  (STANDING):  ARIPiprazole 10 milliGRAM(s) Oral daily    MEDICATIONS  (PRN):  acetaminophen     Tablet .. 650 milliGRAM(s) Oral every 6 hours PRN Temp greater or equal to 38C (100.4F), Mild Pain (1 - 3)  aluminum hydroxide/magnesium hydroxide/simethicone Suspension 30 milliLiter(s) Oral every 4 hours PRN Dyspepsia  diphenhydrAMINE 50 milliGRAM(s) Oral every 6 hours PRN agitation  haloperidol     Tablet 5 milliGRAM(s) Oral every 6 hours PRN agitation  hydrOXYzine hydrochloride 50 milliGRAM(s) Oral every 6 hours PRN Anxiety  LORazepam     Tablet 2 milliGRAM(s) Oral every 6 hours PRN Agitation  magnesium hydroxide Suspension 30 milliLiter(s) Oral daily PRN Constipation  traZODone 50 milliGRAM(s) Oral at bedtime PRN insomnia

## 2023-07-27 NOTE — BH INPATIENT PSYCHIATRY PROGRESS NOTE - NSBHMETABOLIC_PSY_ALL_CORE_FT
BMI: BMI (kg/m2): 27.1 (07-20-23 @ 09:50)  HbA1c:   Glucose:   BP: 114/72 (07-27-23 @ 09:28) (107/68 - 145/81)  Lipid Panel:
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
## 2023-07-27 NOTE — BH INPATIENT PSYCHIATRY PROGRESS NOTE - NSBHCHARTREVIEWVS_PSY_A_CORE FT
Vital Signs Last 24 Hrs  T(C): 36.7 (07-27-23 @ 09:28), Max: 36.7 (07-26-23 @ 16:45)  T(F): 98.1 (07-27-23 @ 09:28), Max: 98.1 (07-27-23 @ 09:28)  HR: 52 (07-27-23 @ 09:28) (52 - 73)  BP: 114/72 (07-27-23 @ 09:28) (114/72 - 119/79)  BP(mean): --  RR: 18 (07-27-23 @ 09:28) (18 - 18)  SpO2: 99% (07-27-23 @ 09:28) (99% - 99%)

## 2023-07-28 PROCEDURE — 99232 SBSQ HOSP IP/OBS MODERATE 35: CPT

## 2023-07-28 RX ORDER — BENZTROPINE MESYLATE 1 MG
1 TABLET ORAL DAILY
Refills: 0 | Status: DISCONTINUED | OUTPATIENT
Start: 2023-07-28 | End: 2023-08-08

## 2023-07-28 RX ADMIN — ARIPIPRAZOLE 10 MILLIGRAM(S): 15 TABLET ORAL at 10:33

## 2023-07-28 NOTE — BH INPATIENT PSYCHIATRY PROGRESS NOTE - NSBHCHARTREVIEWVS_PSY_A_CORE FT
Vital Signs Last 24 Hrs  T(C): 36.7 (07-28-23 @ 08:24), Max: 36.9 (07-27-23 @ 17:01)  T(F): 98 (07-28-23 @ 08:24), Max: 98.4 (07-27-23 @ 17:01)  HR: 60 (07-28-23 @ 08:24) (60 - 61)  BP: 138/72 (07-28-23 @ 08:24) (138/72 - 138/82)  BP(mean): --  RR: 18 (07-28-23 @ 08:24) (18 - 18)  SpO2: 99% (07-28-23 @ 08:24) (99% - 99%)

## 2023-07-28 NOTE — BH INPATIENT PSYCHIATRY PROGRESS NOTE - PRN MEDS
MEDICATIONS  (PRN):  acetaminophen     Tablet .. 650 milliGRAM(s) Oral every 6 hours PRN Temp greater or equal to 38C (100.4F), Mild Pain (1 - 3)  aluminum hydroxide/magnesium hydroxide/simethicone Suspension 30 milliLiter(s) Oral every 4 hours PRN Dyspepsia  benztropine 1 milliGRAM(s) Oral daily PRN eps  diphenhydrAMINE 50 milliGRAM(s) Oral every 6 hours PRN agitation  haloperidol     Tablet 5 milliGRAM(s) Oral every 6 hours PRN agitation  hydrOXYzine hydrochloride 50 milliGRAM(s) Oral every 6 hours PRN Anxiety  magnesium hydroxide Suspension 30 milliLiter(s) Oral daily PRN Constipation  traZODone 50 milliGRAM(s) Oral at bedtime PRN insomnia

## 2023-07-28 NOTE — BH INPATIENT PSYCHIATRY PROGRESS NOTE - NSBHMETABOLIC_PSY_ALL_CORE_FT
BMI: BMI (kg/m2): 27.1 (07-20-23 @ 09:50)  HbA1c:   Glucose:   BP: 138/72 (07-28-23 @ 08:24) (107/68 - 145/81)  Lipid Panel:
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
## 2023-07-28 NOTE — BH INPATIENT PSYCHIATRY PROGRESS NOTE - NSBHCONSDANGERSELF_PSY_A_CORE
suicidal ideation with plan and means/unable to care for self

## 2023-07-28 NOTE — BH INPATIENT PSYCHIATRY PROGRESS NOTE - CURRENT MEDICATION
MEDICATIONS  (STANDING):  ARIPiprazole 10 milliGRAM(s) Oral daily    MEDICATIONS  (PRN):  acetaminophen     Tablet .. 650 milliGRAM(s) Oral every 6 hours PRN Temp greater or equal to 38C (100.4F), Mild Pain (1 - 3)  aluminum hydroxide/magnesium hydroxide/simethicone Suspension 30 milliLiter(s) Oral every 4 hours PRN Dyspepsia  benztropine 1 milliGRAM(s) Oral daily PRN eps  diphenhydrAMINE 50 milliGRAM(s) Oral every 6 hours PRN agitation  haloperidol     Tablet 5 milliGRAM(s) Oral every 6 hours PRN agitation  hydrOXYzine hydrochloride 50 milliGRAM(s) Oral every 6 hours PRN Anxiety  magnesium hydroxide Suspension 30 milliLiter(s) Oral daily PRN Constipation  traZODone 50 milliGRAM(s) Oral at bedtime PRN insomnia

## 2023-07-28 NOTE — BH INPATIENT PSYCHIATRY PROGRESS NOTE - NSBHASSESSSUMMFT_PSY_ALL_CORE
22 yo M PMH schizophrenia, PTSD, Mood disorder presenting for suicidal ideations with a plan and means. States that it was only in the moment and that he no longer feels the desire to take his own life. Is responding to internal stimuli laughing and talking to himself. Is denying SI/HI/AVH and PI. Makes large repetitive movements with neck and arms while not responding to outside stimuli. Seeking medication and therapy at this time. Plan to continue Zyprexa 5mg for psychosis, PRN ativan 2mg, benadryl 50mg, and haldol 5mg for agitation.    Abilify 10mg qd    7/25 Patient noted to be better related, continues to express feelings of fear, paranoia and AH. Is willing to start abilify 5mg   7/26 Patient reflective on circumstances, expressing sadness related to psychosocial stressors and trauma, is taking abilify, may be open to antidepressant  7/27 Will plan to further increase abilify to 10mg qd to address delusions and paranoid ideation  7/28 Patient tolerating med regimen well without issue, no si/hi/avh, no acute medical concerns.

## 2023-07-29 RX ADMIN — ARIPIPRAZOLE 10 MILLIGRAM(S): 15 TABLET ORAL at 11:39

## 2023-07-30 RX ADMIN — ARIPIPRAZOLE 10 MILLIGRAM(S): 15 TABLET ORAL at 09:49

## 2023-07-31 PROCEDURE — 99232 SBSQ HOSP IP/OBS MODERATE 35: CPT

## 2023-07-31 RX ORDER — ARIPIPRAZOLE 15 MG/1
1 TABLET ORAL
Qty: 14 | Refills: 0
Start: 2023-07-31 | End: 2023-08-13

## 2023-07-31 RX ORDER — ARIPIPRAZOLE 15 MG/1
1 TABLET ORAL
Qty: 0 | Refills: 0 | DISCHARGE
Start: 2023-07-31

## 2023-07-31 RX ORDER — HALOPERIDOL DECANOATE 100 MG/ML
5 INJECTION INTRAMUSCULAR ONCE
Refills: 0 | Status: COMPLETED | OUTPATIENT
Start: 2023-07-31 | End: 2023-07-31

## 2023-07-31 RX ORDER — DIPHENHYDRAMINE HCL 50 MG
50 CAPSULE ORAL ONCE
Refills: 0 | Status: COMPLETED | OUTPATIENT
Start: 2023-07-31 | End: 2023-07-31

## 2023-07-31 RX ADMIN — HALOPERIDOL DECANOATE 5 MILLIGRAM(S): 100 INJECTION INTRAMUSCULAR at 14:03

## 2023-07-31 RX ADMIN — Medication 50 MILLIGRAM(S): at 14:03

## 2023-07-31 RX ADMIN — Medication 50 MILLIGRAM(S): at 00:50

## 2023-07-31 RX ADMIN — ARIPIPRAZOLE 10 MILLIGRAM(S): 15 TABLET ORAL at 11:03

## 2023-07-31 RX ADMIN — Medication 2 MILLIGRAM(S): at 14:03

## 2023-07-31 NOTE — BH INPATIENT PSYCHIATRY PROGRESS NOTE - NSBHMETABOLIC_PSY_ALL_CORE_FT
BMI: BMI (kg/m2): 27.1 (07-20-23 @ 09:50)  HbA1c:   Glucose:   BP: 156/89 (07-30-23 @ 16:45) (130/81 - 156/89)  Lipid Panel:

## 2023-07-31 NOTE — BH INPATIENT PSYCHIATRY PROGRESS NOTE - NSBHCHARTREVIEWVS_PSY_A_CORE FT
Vital Signs Last 24 Hrs  T(C): 37 (07-30-23 @ 16:45), Max: 37 (07-30-23 @ 16:45)  T(F): 98.6 (07-30-23 @ 16:45), Max: 98.6 (07-30-23 @ 16:45)  HR: 80 (07-30-23 @ 16:45) (80 - 80)  BP: 156/89 (07-30-23 @ 16:45) (156/89 - 156/89)  BP(mean): --  RR: 18 (07-30-23 @ 16:45) (18 - 18)  SpO2: 98% (07-30-23 @ 16:45) (98% - 98%)
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
## 2023-07-31 NOTE — BH INPATIENT PSYCHIATRY PROGRESS NOTE - NSBHASSESSSUMMFT_PSY_ALL_CORE
24 yo M PMH schizophrenia, PTSD, Mood disorder presenting for suicidal ideations with a plan and means. States that it was only in the moment and that he no longer feels the desire to take his own life. Is responding to internal stimuli laughing and talking to himself. Is denying SI/HI/AVH and PI. Makes large repetitive movements with neck and arms while not responding to outside stimuli. Seeking medication and therapy at this time. Plan to continue Zyprexa 5mg for psychosis, PRN ativan 2mg, benadryl 50mg, and haldol 5mg for agitation.    Abilify 10mg qd    7/25 Patient noted to be better related, continues to express feelings of fear, paranoia and AH. Is willing to start abilify 5mg   7/26 Patient reflective on circumstances, expressing sadness related to psychosocial stressors and trauma, is taking abilify, may be open to antidepressant  7/27 Will plan to further increase abilify to 10mg qd to address delusions and paranoid ideation  7/28 Patient tolerating med regimen well without issue, no si/hi/avh, no acute medical concerns.  7/31 Pt well related, no si/hi/avh or PI

## 2023-07-31 NOTE — BH INPATIENT PSYCHIATRY PROGRESS NOTE - NSBHFUPINTERVALHXFT_PSY_A_CORE
Patient visible on the unit this morning, seen interacting appropriately with peers and staff. Reports having a fair weekend, states that he is feeling better since admission, has been thinking a lot about home. 'I was in a different headspace when I first got here.' Denies si/hi/avh or PI. Future oriented, wanting to go back to his shelter and get his things, then continue with the plan of trying to get a job and his own place to live. No acute medical concerns. States that sleep and appetite are overall fair. Has no complaints. Discharge focused and anticipating discharge tomorrow. Remains compliant with med regimen and denies any side effects or adverse reactions.

## 2023-08-01 PROCEDURE — 99233 SBSQ HOSP IP/OBS HIGH 50: CPT

## 2023-08-01 RX ORDER — ARIPIPRAZOLE 15 MG/1
15 TABLET ORAL DAILY
Refills: 0 | Status: DISCONTINUED | OUTPATIENT
Start: 2023-08-01 | End: 2023-08-05

## 2023-08-01 RX ORDER — DIPHENHYDRAMINE HCL 50 MG
50 CAPSULE ORAL ONCE
Refills: 0 | Status: COMPLETED | OUTPATIENT
Start: 2023-08-01 | End: 2023-08-01

## 2023-08-01 RX ORDER — HALOPERIDOL DECANOATE 100 MG/ML
5 INJECTION INTRAMUSCULAR ONCE
Refills: 0 | Status: COMPLETED | OUTPATIENT
Start: 2023-08-01 | End: 2023-08-01

## 2023-08-01 RX ORDER — VALPROIC ACID (AS SODIUM SALT) 250 MG/5ML
750 SOLUTION, ORAL ORAL
Refills: 0 | Status: DISCONTINUED | OUTPATIENT
Start: 2023-08-01 | End: 2023-08-08

## 2023-08-01 RX ADMIN — Medication 4 MILLIGRAM(S): at 16:14

## 2023-08-01 RX ADMIN — HALOPERIDOL DECANOATE 5 MILLIGRAM(S): 100 INJECTION INTRAMUSCULAR at 16:15

## 2023-08-01 RX ADMIN — Medication 50 MILLIGRAM(S): at 16:15

## 2023-08-01 NOTE — BH INPATIENT PSYCHIATRY PROGRESS NOTE - NSBHMETABOLIC_PSY_ALL_CORE_FT
BMI: BMI (kg/m2): 27.1 (07-20-23 @ 09:50)  HbA1c:   Glucose:   BP: 134/73 (08-01-23 @ 09:10) (130/81 - 156/89)  Lipid Panel:
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
## 2023-08-01 NOTE — BH INPATIENT PSYCHIATRY PROGRESS NOTE - NSBHATTESTTYPEVISIT_PSY_A_CORE
[History reviewed] : History reviewed. [Medications and Allergies reviewed] : Medications and allergies reviewed. On-site Attending supervising ILENE (99XXX codes)

## 2023-08-01 NOTE — BH INPATIENT PSYCHIATRY PROGRESS NOTE - CURRENT MEDICATION
MEDICATIONS  (STANDING):  ARIPiprazole 15 milliGRAM(s) Oral daily  valproic  acid Syrup 750 milliGRAM(s) Oral two times a day    MEDICATIONS  (PRN):  acetaminophen     Tablet .. 650 milliGRAM(s) Oral every 6 hours PRN Temp greater or equal to 38C (100.4F), Mild Pain (1 - 3)  aluminum hydroxide/magnesium hydroxide/simethicone Suspension 30 milliLiter(s) Oral every 4 hours PRN Dyspepsia  benztropine 1 milliGRAM(s) Oral daily PRN eps  diphenhydrAMINE 50 milliGRAM(s) Oral every 6 hours PRN agitation  haloperidol     Tablet 5 milliGRAM(s) Oral every 6 hours PRN agitation  hydrOXYzine hydrochloride 50 milliGRAM(s) Oral every 6 hours PRN Anxiety  magnesium hydroxide Suspension 30 milliLiter(s) Oral daily PRN Constipation  traZODone 50 milliGRAM(s) Oral at bedtime PRN insomnia

## 2023-08-01 NOTE — BH INPATIENT PSYCHIATRY PROGRESS NOTE - NSBHCHARTREVIEWVS_PSY_A_CORE FT
Vital Signs Last 24 Hrs  T(C): 36.6 (08-01-23 @ 09:10), Max: 36.6 (08-01-23 @ 09:10)  T(F): 97.9 (08-01-23 @ 09:10), Max: 97.9 (08-01-23 @ 09:10)  HR: 97 (08-01-23 @ 09:10) (97 - 97)  BP: 134/73 (08-01-23 @ 09:10) (134/73 - 134/73)  BP(mean): --  RR: 18 (08-01-23 @ 09:10) (18 - 18)  SpO2: 98% (08-01-23 @ 09:10) (98% - 98%)

## 2023-08-01 NOTE — BH INPATIENT PSYCHIATRY PROGRESS NOTE - NSBHMSEBEHAV_PSY_A_CORE
Patient states that she signed the paper for the appeal but did not hear anything from our office. She says she is still in pain and would like to try and get the injections. Uncooperative

## 2023-08-02 PROCEDURE — 99233 SBSQ HOSP IP/OBS HIGH 50: CPT

## 2023-08-02 RX ORDER — DIPHENHYDRAMINE HCL 50 MG
50 CAPSULE ORAL ONCE
Refills: 0 | Status: COMPLETED | OUTPATIENT
Start: 2023-08-02 | End: 2023-08-02

## 2023-08-02 RX ORDER — HALOPERIDOL DECANOATE 100 MG/ML
10 INJECTION INTRAMUSCULAR ONCE
Refills: 0 | Status: COMPLETED | OUTPATIENT
Start: 2023-08-02 | End: 2023-08-02

## 2023-08-02 RX ORDER — OLANZAPINE 15 MG/1
10 TABLET, FILM COATED ORAL ONCE
Refills: 0 | Status: COMPLETED | OUTPATIENT
Start: 2023-08-02 | End: 2023-08-02

## 2023-08-02 RX ORDER — CLONAZEPAM 1 MG
2 TABLET ORAL
Refills: 0 | Status: DISCONTINUED | OUTPATIENT
Start: 2023-08-02 | End: 2023-08-05

## 2023-08-02 RX ADMIN — Medication 4 MILLIGRAM(S): at 22:05

## 2023-08-02 RX ADMIN — Medication 50 MILLIGRAM(S): at 22:05

## 2023-08-02 RX ADMIN — HALOPERIDOL DECANOATE 10 MILLIGRAM(S): 100 INJECTION INTRAMUSCULAR at 13:08

## 2023-08-02 RX ADMIN — Medication 4 MILLIGRAM(S): at 13:01

## 2023-08-02 RX ADMIN — HALOPERIDOL DECANOATE 10 MILLIGRAM(S): 100 INJECTION INTRAMUSCULAR at 22:05

## 2023-08-02 RX ADMIN — OLANZAPINE 10 MILLIGRAM(S): 15 TABLET, FILM COATED ORAL at 10:45

## 2023-08-02 RX ADMIN — Medication 50 MILLIGRAM(S): at 10:45

## 2023-08-02 RX ADMIN — Medication 50 MILLIGRAM(S): at 13:07
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
## 2023-08-02 NOTE — BH INPATIENT PSYCHIATRY PROGRESS NOTE - NSBHMETABOLIC_PSY_ALL_CORE_FT
BMI: BMI (kg/m2): 27.1 (07-20-23 @ 09:50)  HbA1c:   Glucose:   BP: 134/73 (08-01-23 @ 09:10) (134/73 - 156/89)  Lipid Panel:
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
## 2023-08-02 NOTE — BH CHART NOTE - NSEVENTNOTEFT_PSY_ALL_CORE
Patient observed to be punching a hole into the wall behind his door exposing internal structures and metal pipes etc. Also pulled out a wall stud which had nails etc embedded in it. Staff was able to secure this from the patient. Security presence requested and pt encouraged to vacate his room. Encouraged to then go into open quiet room, but became increasingly agitated, verbally aggressive with physical posturing. He was given haldol 10mg/ativan 4mg/benadryl 50mg IM and placed in locked quiet room.
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
## 2023-08-02 NOTE — BH INPATIENT PSYCHIATRY PROGRESS NOTE - NSBHMSETHTCONTENT_PSY_A_CORE
26-year-old male hx of bipolar disorder previously on Depakote reportedly (has not taken meds in 1-2 years) presenting today to see Psychiatry for a medication refill without any emergent Psychiatric symptoms. Advised patient that he needs to follow up as an outpatient, will have referrals coordinator reach out to patient to assist with scheduling rapid follow-up. Delusions

## 2023-08-02 NOTE — PROVIDER CONTACT NOTE (OTHER) - ACTION/TREATMENT ORDERED:
Benadryl 50mg + Haldol 10mg + Ativan 4mg IM + locked seclusion.
Benadryl 50mg + Haldol 5mg + Ativan 4mg given
F: LR @ 80 x12 hours while still nauseous  E: replete generously in setting of up-titration of insulin, insulin will cause both K and Mg to move intracellularly, and refeeding can dramatically decrease phos as ATP is generated and inorganic phosphate is used up  N: consistent CHO with evening snack    # Need for prophylactic measure  DVT ppx: Lovenox, SCDs  GI: none indicated
F: LR @ 80 x12 hours while still nauseous  E: replete K>4  N: consistent CHO with evening snack    # Need for prophylactic measure  DVT ppx: Lovenox, SCDs  GI: none indicated

## 2023-08-02 NOTE — BH INPATIENT PSYCHIATRY PROGRESS NOTE - CURRENT MEDICATION
MEDICATIONS  (STANDING):  ARIPiprazole 15 milliGRAM(s) Oral daily  clonazePAM  Tablet 2 milliGRAM(s) Oral two times a day  valproic  acid Syrup 750 milliGRAM(s) Oral two times a day    MEDICATIONS  (PRN):  acetaminophen     Tablet .. 650 milliGRAM(s) Oral every 6 hours PRN Temp greater or equal to 38C (100.4F), Mild Pain (1 - 3)  aluminum hydroxide/magnesium hydroxide/simethicone Suspension 30 milliLiter(s) Oral every 4 hours PRN Dyspepsia  benztropine 1 milliGRAM(s) Oral daily PRN eps  diphenhydrAMINE 50 milliGRAM(s) Oral every 6 hours PRN agitation  haloperidol     Tablet 5 milliGRAM(s) Oral every 6 hours PRN agitation  hydrOXYzine hydrochloride 50 milliGRAM(s) Oral every 6 hours PRN Anxiety  magnesium hydroxide Suspension 30 milliLiter(s) Oral daily PRN Constipation  traZODone 50 milliGRAM(s) Oral at bedtime PRN insomnia
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
## 2023-08-02 NOTE — PROVIDER CONTACT NOTE (OTHER) - SITUATION
Upon hearing that patient would not be discharged, pt became angry, loud, irritable, and with pressured speech. Pacing, loud, and threatening.
Pt removing drywall and other materials from the wall, including a metal bar with screws.

## 2023-08-02 NOTE — BH CHART NOTE - NSEVENTNOTEFT_PSY_ALL_CORE
Patient running towards the exit today, aggressive, internally preoccupied, trying door handles. Given IM of zyprexa 10mg/benadryl 50mg.

## 2023-08-03 DIAGNOSIS — F12.90 CANNABIS USE, UNSPECIFIED, UNCOMPLICATED: ICD-10-CM

## 2023-08-03 PROCEDURE — 99232 SBSQ HOSP IP/OBS MODERATE 35: CPT

## 2023-08-03 RX ORDER — HALOPERIDOL DECANOATE 100 MG/ML
10 INJECTION INTRAMUSCULAR ONCE
Refills: 0 | Status: COMPLETED | OUTPATIENT
Start: 2023-08-03 | End: 2023-08-03

## 2023-08-03 RX ORDER — DIPHENHYDRAMINE HCL 50 MG
50 CAPSULE ORAL ONCE
Refills: 0 | Status: COMPLETED | OUTPATIENT
Start: 2023-08-03 | End: 2023-08-03

## 2023-08-03 RX ORDER — HALOPERIDOL DECANOATE 100 MG/ML
5 INJECTION INTRAMUSCULAR ONCE
Refills: 0 | Status: DISCONTINUED | OUTPATIENT
Start: 2023-08-03 | End: 2023-08-03

## 2023-08-03 RX ADMIN — Medication 2 MILLIGRAM(S): at 14:31

## 2023-08-03 RX ADMIN — Medication 50 MILLIGRAM(S): at 19:52

## 2023-08-03 RX ADMIN — HALOPERIDOL DECANOATE 10 MILLIGRAM(S): 100 INJECTION INTRAMUSCULAR at 14:31

## 2023-08-03 RX ADMIN — Medication 4 MILLIGRAM(S): at 19:53

## 2023-08-03 RX ADMIN — HALOPERIDOL DECANOATE 10 MILLIGRAM(S): 100 INJECTION INTRAMUSCULAR at 19:52

## 2023-08-03 RX ADMIN — ARIPIPRAZOLE 15 MILLIGRAM(S): 15 TABLET ORAL at 13:47

## 2023-08-03 RX ADMIN — Medication 50 MILLIGRAM(S): at 14:31
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
## 2023-08-03 NOTE — BH TREATMENT PLAN - NSBHPRIMARYDX_PSY_ALL_CORE
Paranoid schizophrenia    
Psychosis, schizophrenia, simple    
Psychosis, schizophrenia, simple

## 2023-08-03 NOTE — BH INPATIENT PSYCHIATRY PROGRESS NOTE - NSBHCHARTREVIEWVS_PSY_A_CORE FT
Vital Signs Last 24 Hrs  T(C): 36.8 (08-03-23 @ 09:00), Max: 36.8 (08-03-23 @ 09:00)  T(F): 98.3 (08-03-23 @ 09:00), Max: 98.3 (08-03-23 @ 09:00)  HR: 110 (08-03-23 @ 09:00) (110 - 110)  BP: 112/59 (08-03-23 @ 09:00) (112/59 - 112/59)  BP(mean): --  RR: 18 (08-03-23 @ 09:00) (18 - 18)  SpO2: 98% (08-03-23 @ 09:00) (98% - 98%)

## 2023-08-03 NOTE — BH TREATMENT PLAN - NSTXSUICIDGOALOTHER_PSY_ALL_CORE
Pt will stabilize mood and alleviate symptoms of SI to engage in discharge planning.

## 2023-08-03 NOTE — BH TREATMENT PLAN - NSTXVIOLNTINTERRN_PSY_ALL_CORE
[employer] enviornment is assess for any objects with potential to cause harm. Offer medication for agitation

## 2023-08-03 NOTE — BH TREATMENT PLAN - NSTXSUICIDINTERRN_PSY_ALL_CORE
Risk assessment and establishing therapeutic relationship. Identify characteristics or behaviors pertaining to suicidal ideations. Assess for early signs of distress or anxiety and investigate possible causes.

## 2023-08-03 NOTE — BH TREATMENT PLAN - NSTXPSYCHOINTERRN_PSY_ALL_CORE
Help the client identify times when the hallucinations are most prevalent and frightening. Decrease environmental stimuli when possible (low noise, minimal activity). Intervene with one-on-one, seclusion, or PRN medication (As ordered) when appropriate.
Help the client identify times when the hallucinations are most prevalent and frightening. Decrease environmental stimuli when possible (low noise, minimal activity). Intervene with one-on-one, seclusion, or PRN medication (As ordered) when appropriate.
Accept the fact that the voices are real to the client, but explain that you do not hear the voices. Refer to the voices as “your voices” or “voices that you hear”. Help the client identify times when the hallucinations are most prevalent and frightening. Decrease environmental stimuli when possible (low noise, minimal activity). Intervene with one-on-one, seclusion, or PRN medication (As ordered) when appropriate.

## 2023-08-03 NOTE — BH TREATMENT PLAN - NSPTSTATEDGOAL_PSY_ALL_CORE
" I want medication and therapy." 

## 2023-08-03 NOTE — BH INPATIENT PSYCHIATRY PROGRESS NOTE - NSBHFUPINTERVALHXFT_PSY_A_CORE
Patient verbally aggressive this morning towards writer, displaying anger and resentment with no insight into illness. States that he is being lied to and has been in the hospital x3 months, not the 2 weeks indicated on his wrist band. Focused on discharge and suing the hospital for keeping him here against his will and giving him IM medications. States that he doesn't intend to take medications in the hospital. Conversation was terminated by writer after pt began making derogatory comments towards writer.   -Later is seen by writer and SW, conversation was entirely on topic of discharge. Observed to be responding to internal stimuli evidenced by nodding/shaking head and talking to self. Calmer with SW presence. He states that he is performing 'skits' like actors do when they are bored or practicing.

## 2023-08-03 NOTE — BH TREATMENT PLAN - NSTXPSYCHOINTERSW_PSY_ALL_CORE
1. SW will liaison with family/collateral providers as need.    2. Discharge planning.

## 2023-08-03 NOTE — BH TREATMENT PLAN - NSTXPSYCHOINTERPR_PSY_ALL_CORE
Pt will be invited and encouraged to attend all offered, self selected groups
Pt. will continue to be invited to all offered groups
Pt. will continue to be invited to all offered groups

## 2023-08-03 NOTE — BH TREATMENT PLAN - NSTXPATIENTPARTICIPATE_PSY_ALL_CORE
No, patient unwilling to participate

## 2023-08-03 NOTE — BH TREATMENT PLAN - NSTXPLANTHERAPYSESSIONSFT_PSY_ALL_CORE
07-28-23  Type of therapy: Creative arts therapy  Type of session: Group  Level of patient participation: Quiet, Resistance to participation  Duration of participation: 45 minutes  Therapy conducted by: Psych rehab  Therapy Summary: Pt attended group with declined to participate in group process - did not introduce himself in check in, did not follow art therapy prompt, and declined to speak about the artwork he created independently of others. Pt appeared flat with poor eye contact, declining engagement when initiated by a peer. Pt's artwork included death-related imagery, but Pt denied any SI when asked at the close of the group. Pt declined offered to meet with writer for individual session at that time.  
  07-26-23  Type of therapy: Creative arts therapy  Type of session: Group  Level of patient participation: Quiet  Duration of participation: 45 minutes  Therapy conducted by: Psych rehab  Therapy Summary: Pt. attended open studio group; pt. needed redirection when he took the keyboard to play without asking; pt. was irritated by the boundary, but was under control; pt. wrote quietly for the duration of the group

## 2023-08-03 NOTE — BH TREATMENT PLAN - NSTXPSYCHOGOALOTHER_PSY_ALL_CORE
Pt will participate in groups and in the milieu treatment structure of the unit
Pt. will participate in groups and milieu tx. structure of unit
Pt. will participate in groups and milieu tx. structure of unit

## 2023-08-03 NOTE — BH INPATIENT PSYCHIATRY PROGRESS NOTE - NSBHMETABOLIC_PSY_ALL_CORE_FT
BMI: BMI (kg/m2): 27.1 (07-20-23 @ 09:50)  HbA1c:   Glucose:   BP: 112/59 (08-03-23 @ 09:00) (112/59 - 134/73)  Lipid Panel:

## 2023-08-04 PROCEDURE — 99232 SBSQ HOSP IP/OBS MODERATE 35: CPT

## 2023-08-04 RX ORDER — HALOPERIDOL DECANOATE 100 MG/ML
10 INJECTION INTRAMUSCULAR ONCE
Refills: 0 | Status: COMPLETED | OUTPATIENT
Start: 2023-08-04 | End: 2023-08-04

## 2023-08-04 RX ORDER — DIPHENHYDRAMINE HCL 50 MG
50 CAPSULE ORAL ONCE
Refills: 0 | Status: COMPLETED | OUTPATIENT
Start: 2023-08-04 | End: 2023-08-04

## 2023-08-04 RX ADMIN — Medication 4 MILLIGRAM(S): at 12:45

## 2023-08-04 RX ADMIN — HALOPERIDOL DECANOATE 10 MILLIGRAM(S): 100 INJECTION INTRAMUSCULAR at 21:32

## 2023-08-04 RX ADMIN — ARIPIPRAZOLE 15 MILLIGRAM(S): 15 TABLET ORAL at 11:00

## 2023-08-04 RX ADMIN — HALOPERIDOL DECANOATE 10 MILLIGRAM(S): 100 INJECTION INTRAMUSCULAR at 12:45

## 2023-08-04 RX ADMIN — Medication 50 MILLIGRAM(S): at 12:45

## 2023-08-04 RX ADMIN — Medication 50 MILLIGRAM(S): at 21:32

## 2023-08-04 RX ADMIN — Medication 4 MILLIGRAM(S): at 21:32

## 2023-08-04 RX ADMIN — Medication 2 MILLIGRAM(S): at 11:01
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
## 2023-08-04 NOTE — BH INPATIENT PSYCHIATRY PROGRESS NOTE - NSICDXBHTERTIARYDX_PSY_ALL_CORE
R/O Substance-induced psychotic disorder with hallucinations   F19.951  R/O Schizoaffective psychosis   F25.9  R/O Mood disorder with psychosis   F39   R/O Substance-induced psychotic disorder with hallucinations   F19.951  R/O Schizoaffective psychosis   F25.9  R/O Mood disorder with psychosis   F39  R/O Cluster B personality disorder in adult   F60.9

## 2023-08-04 NOTE — BH INPATIENT PSYCHIATRY PROGRESS NOTE - NSBHMETABOLIC_PSY_ALL_CORE_FT
BMI: BMI (kg/m2): 27.1 (07-20-23 @ 09:50)  HbA1c:   Glucose:   BP: 112/59 (08-03-23 @ 09:00) (112/59 - 112/59)  Lipid Panel:
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
## 2023-08-04 NOTE — BH INPATIENT PSYCHIATRY PROGRESS NOTE - OTHER
Denies, but is responding to internal stimuli. Seen laughing and speaking to himself   Denies, but is responding to internal stimuli. Seen laughing and speaking to himself, nodding and shaking his head at times

## 2023-08-05 PROCEDURE — 99233 SBSQ HOSP IP/OBS HIGH 50: CPT

## 2023-08-05 RX ORDER — CHLORPROMAZINE HCL 10 MG
100 TABLET ORAL ONCE
Refills: 0 | Status: COMPLETED | OUTPATIENT
Start: 2023-08-05 | End: 2023-08-05

## 2023-08-05 RX ORDER — ARIPIPRAZOLE 15 MG/1
25 TABLET ORAL DAILY
Refills: 0 | Status: DISCONTINUED | OUTPATIENT
Start: 2023-08-06 | End: 2023-08-07

## 2023-08-05 RX ORDER — HALOPERIDOL DECANOATE 100 MG/ML
10 INJECTION INTRAMUSCULAR ONCE
Refills: 0 | Status: COMPLETED | OUTPATIENT
Start: 2023-08-05 | End: 2023-08-05

## 2023-08-05 RX ORDER — CLONAZEPAM 1 MG
2 TABLET ORAL THREE TIMES A DAY
Refills: 0 | Status: DISCONTINUED | OUTPATIENT
Start: 2023-08-05 | End: 2023-08-05

## 2023-08-05 RX ORDER — CHLORPROMAZINE HCL 10 MG
50 TABLET ORAL ONCE
Refills: 0 | Status: COMPLETED | OUTPATIENT
Start: 2023-08-05 | End: 2023-08-05

## 2023-08-05 RX ORDER — DIPHENHYDRAMINE HCL 50 MG
50 CAPSULE ORAL ONCE
Refills: 0 | Status: COMPLETED | OUTPATIENT
Start: 2023-08-05 | End: 2023-08-05

## 2023-08-05 RX ORDER — CLONAZEPAM 1 MG
2 TABLET ORAL THREE TIMES A DAY
Refills: 0 | Status: DISCONTINUED | OUTPATIENT
Start: 2023-08-05 | End: 2023-08-08

## 2023-08-05 RX ORDER — CHLORPROMAZINE HCL 10 MG
50 TABLET ORAL EVERY 4 HOURS
Refills: 0 | Status: DISCONTINUED | OUTPATIENT
Start: 2023-08-05 | End: 2023-08-06

## 2023-08-05 RX ADMIN — HALOPERIDOL DECANOATE 10 MILLIGRAM(S): 100 INJECTION INTRAMUSCULAR at 06:55

## 2023-08-05 RX ADMIN — Medication 100 MILLIGRAM(S): at 08:08

## 2023-08-05 RX ADMIN — Medication 2 MILLIGRAM(S): at 09:03

## 2023-08-05 RX ADMIN — Medication 50 MILLIGRAM(S): at 06:55

## 2023-08-05 RX ADMIN — Medication 50 MILLIGRAM(S): at 18:53

## 2023-08-05 RX ADMIN — Medication 4 MILLIGRAM(S): at 06:55

## 2023-08-05 RX ADMIN — Medication 50 MILLIGRAM(S): at 20:11

## 2023-08-05 RX ADMIN — ARIPIPRAZOLE 15 MILLIGRAM(S): 15 TABLET ORAL at 12:24

## 2023-08-05 NOTE — BH INPATIENT PSYCHIATRY PROGRESS NOTE - NSBHATTESTBILLING_PSY_A_CORE
10516-[id number] OBS or IP - moderate complexity OR 35-49 mins 20694-[id number] OBS or IP - high complexity OR 50-79 mins
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
## 2023-08-05 NOTE — BH INPATIENT PSYCHIATRY PROGRESS NOTE - NSBHMETABOLIC_PSY_ALL_CORE_FT
BMI: BMI (kg/m2): 27.1 (07-20-23 @ 09:50)  HbA1c:   Glucose:   BP: 112/59 (08-03-23 @ 09:00) (112/59 - 112/59)  Lipid Panel:  BMI: BMI (kg/m2): 27.1 (07-20-23 @ 09:50)  HbA1c:   Glucose:   BP: 112/73 (08-05-23 @ 12:15) (107/67 - 112/73)  Lipid Panel:
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
## 2023-08-05 NOTE — BH INPATIENT PSYCHIATRY PROGRESS NOTE - NSBHADMITIPREASONDETAILS_PSY_A_CORE FT
discharge planning for tomorrow

## 2023-08-05 NOTE — BH INPATIENT PSYCHIATRY PROGRESS NOTE - NSBHMSEPERCEPT_PSY_A_CORE
patient clearly responding to internal stimuli in the form of auditory hallucinations/Auditory hallucinations/Other patient clearly responding to internal stimuli in the form of auditory hallucinations/Auditory hallucinations

## 2023-08-05 NOTE — BH INPATIENT PSYCHIATRY PROGRESS NOTE - OTHER
Denies, but is responding to internal stimuli. Seen laughing and speaking to himself, nodding and shaking his head at times poor dentition
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
## 2023-08-05 NOTE — BH INPATIENT PSYCHIATRY PROGRESS NOTE - CURRENT MEDICATION
MEDICATIONS  (STANDING):  ARIPiprazole 15 milliGRAM(s) Oral daily  chlorproMAZINE    Tablet 50 milliGRAM(s) Oral once  clonazePAM  Tablet 2 milliGRAM(s) Oral two times a day  valproic  acid Syrup 750 milliGRAM(s) Oral two times a day    MEDICATIONS  (PRN):  acetaminophen     Tablet .. 650 milliGRAM(s) Oral every 6 hours PRN Temp greater or equal to 38C (100.4F), Mild Pain (1 - 3)  aluminum hydroxide/magnesium hydroxide/simethicone Suspension 30 milliLiter(s) Oral every 4 hours PRN Dyspepsia  benztropine 1 milliGRAM(s) Oral daily PRN eps  diphenhydrAMINE 50 milliGRAM(s) Oral every 6 hours PRN agitation  haloperidol     Tablet 5 milliGRAM(s) Oral every 6 hours PRN agitation  hydrOXYzine hydrochloride 50 milliGRAM(s) Oral every 6 hours PRN Anxiety  magnesium hydroxide Suspension 30 milliLiter(s) Oral daily PRN Constipation  traZODone 50 milliGRAM(s) Oral at bedtime PRN insomnia   MEDICATIONS  (STANDING):  valproic  acid Syrup 750 milliGRAM(s) Oral two times a day    MEDICATIONS  (PRN):  acetaminophen     Tablet .. 650 milliGRAM(s) Oral every 6 hours PRN Temp greater or equal to 38C (100.4F), Mild Pain (1 - 3)  aluminum hydroxide/magnesium hydroxide/simethicone Suspension 30 milliLiter(s) Oral every 4 hours PRN Dyspepsia  benztropine 1 milliGRAM(s) Oral daily PRN eps  chlorproMAZINE    Tablet 50 milliGRAM(s) Oral every 4 hours PRN agitation  clonazePAM  Tablet 2 milliGRAM(s) Oral three times a day PRN agitation  diphenhydrAMINE 50 milliGRAM(s) Oral every 6 hours PRN agitation  hydrOXYzine hydrochloride 50 milliGRAM(s) Oral every 6 hours PRN Anxiety  magnesium hydroxide Suspension 30 milliLiter(s) Oral daily PRN Constipation  traZODone 50 milliGRAM(s) Oral at bedtime PRN insomnia

## 2023-08-05 NOTE — BH INPATIENT PSYCHIATRY PROGRESS NOTE - PRN MEDS
MEDICATIONS  (PRN):  acetaminophen     Tablet .. 650 milliGRAM(s) Oral every 6 hours PRN Temp greater or equal to 38C (100.4F), Mild Pain (1 - 3)  aluminum hydroxide/magnesium hydroxide/simethicone Suspension 30 milliLiter(s) Oral every 4 hours PRN Dyspepsia  benztropine 1 milliGRAM(s) Oral daily PRN eps  diphenhydrAMINE 50 milliGRAM(s) Oral every 6 hours PRN agitation  haloperidol     Tablet 5 milliGRAM(s) Oral every 6 hours PRN agitation  hydrOXYzine hydrochloride 50 milliGRAM(s) Oral every 6 hours PRN Anxiety  magnesium hydroxide Suspension 30 milliLiter(s) Oral daily PRN Constipation  traZODone 50 milliGRAM(s) Oral at bedtime PRN insomnia   MEDICATIONS  (PRN):  acetaminophen     Tablet .. 650 milliGRAM(s) Oral every 6 hours PRN Temp greater or equal to 38C (100.4F), Mild Pain (1 - 3)  aluminum hydroxide/magnesium hydroxide/simethicone Suspension 30 milliLiter(s) Oral every 4 hours PRN Dyspepsia  benztropine 1 milliGRAM(s) Oral daily PRN eps  chlorproMAZINE    Tablet 50 milliGRAM(s) Oral every 4 hours PRN agitation  clonazePAM  Tablet 2 milliGRAM(s) Oral three times a day PRN agitation  diphenhydrAMINE 50 milliGRAM(s) Oral every 6 hours PRN agitation  hydrOXYzine hydrochloride 50 milliGRAM(s) Oral every 6 hours PRN Anxiety  magnesium hydroxide Suspension 30 milliLiter(s) Oral daily PRN Constipation  traZODone 50 milliGRAM(s) Oral at bedtime PRN insomnia

## 2023-08-05 NOTE — BH INPATIENT PSYCHIATRY PROGRESS NOTE - NSICDXBHTERTIARYDX_PSY_ALL_CORE
R/O Substance-induced psychotic disorder with hallucinations   F19.951  R/O Schizoaffective psychosis   F25.9  R/O Mood disorder with psychosis   F39  R/O Cluster B personality disorder in adult   F60.9

## 2023-08-05 NOTE — BH INPATIENT PSYCHIATRY PROGRESS NOTE - NSBHCHARTREVIEWVS_PSY_A_CORE FT
Vital Signs Last 24 Hrs  T(C): --  T(F): --  HR: --  BP: --  BP(mean): --  RR: --  SpO2: --     Vital Signs Last 24 Hrs  T(C): 36.7 (08-05-23 @ 09:49), Max: 36.7 (08-05-23 @ 09:49)  T(F): 98.1 (08-05-23 @ 09:49), Max: 98.1 (08-05-23 @ 09:49)  HR: 89 (08-05-23 @ 12:15) (62 - 89)  BP: 112/73 (08-05-23 @ 12:15) (107/67 - 112/73)  BP(mean): --  RR: 16 (08-05-23 @ 12:15) (16 - 18)  SpO2: 99% (08-05-23 @ 12:15) (97% - 99%)

## 2023-08-06 PROCEDURE — 99233 SBSQ HOSP IP/OBS HIGH 50: CPT

## 2023-08-06 RX ORDER — CHLORPROMAZINE HCL 10 MG
100 TABLET ORAL EVERY 4 HOURS
Refills: 0 | Status: DISCONTINUED | OUTPATIENT
Start: 2023-08-06 | End: 2023-08-08

## 2023-08-06 RX ORDER — CHLORPROMAZINE HCL 10 MG
100 TABLET ORAL ONCE
Refills: 0 | Status: DISCONTINUED | OUTPATIENT
Start: 2023-08-06 | End: 2023-08-08

## 2023-08-06 RX ADMIN — Medication 100 MILLIGRAM(S): at 21:42

## 2023-08-06 RX ADMIN — Medication 50 MILLIGRAM(S): at 09:24

## 2023-08-06 RX ADMIN — ARIPIPRAZOLE 25 MILLIGRAM(S): 15 TABLET ORAL at 09:24

## 2023-08-06 RX ADMIN — Medication 100 MILLIGRAM(S): at 13:00

## 2023-08-06 NOTE — BH INPATIENT PSYCHIATRY PROGRESS NOTE - NSBHCHARTREVIEWVS_PSY_A_CORE FT
Vital Signs Last 24 Hrs  T(C): --  T(F): --  HR: 89 (08-05-23 @ 12:15) (89 - 89)  BP: 112/73 (08-05-23 @ 12:15) (112/73 - 112/73)  BP(mean): --  RR: 16 (08-05-23 @ 12:15) (16 - 16)  SpO2: 99% (08-05-23 @ 12:15) (99% - 99%)

## 2023-08-06 NOTE — BH INPATIENT PSYCHIATRY PROGRESS NOTE - PRN MEDS
MEDICATIONS  (PRN):  acetaminophen     Tablet .. 650 milliGRAM(s) Oral every 6 hours PRN Temp greater or equal to 38C (100.4F), Mild Pain (1 - 3)  aluminum hydroxide/magnesium hydroxide/simethicone Suspension 30 milliLiter(s) Oral every 4 hours PRN Dyspepsia  benztropine 1 milliGRAM(s) Oral daily PRN eps  chlorproMAZINE    Tablet 50 milliGRAM(s) Oral every 4 hours PRN agitation  clonazePAM  Tablet 2 milliGRAM(s) Oral three times a day PRN agitation  diphenhydrAMINE 50 milliGRAM(s) Oral every 6 hours PRN agitation  hydrOXYzine hydrochloride 50 milliGRAM(s) Oral every 6 hours PRN Anxiety  magnesium hydroxide Suspension 30 milliLiter(s) Oral daily PRN Constipation  traZODone 50 milliGRAM(s) Oral at bedtime PRN insomnia
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
## 2023-08-06 NOTE — BH INPATIENT PSYCHIATRY PROGRESS NOTE - CURRENT MEDICATION
MEDICATIONS  (STANDING):  ARIPiprazole 25 milliGRAM(s) Oral daily  chlorproMAZINE    Injectable 100 milliGRAM(s) IntraMuscular once  LORazepam   Injectable 4 milliGRAM(s) IntraMuscular once  valproic  acid Syrup 750 milliGRAM(s) Oral two times a day    MEDICATIONS  (PRN):  acetaminophen     Tablet .. 650 milliGRAM(s) Oral every 6 hours PRN Temp greater or equal to 38C (100.4F), Mild Pain (1 - 3)  aluminum hydroxide/magnesium hydroxide/simethicone Suspension 30 milliLiter(s) Oral every 4 hours PRN Dyspepsia  benztropine 1 milliGRAM(s) Oral daily PRN eps  chlorproMAZINE    Tablet 50 milliGRAM(s) Oral every 4 hours PRN agitation  clonazePAM  Tablet 2 milliGRAM(s) Oral three times a day PRN agitation  diphenhydrAMINE 50 milliGRAM(s) Oral every 6 hours PRN agitation  hydrOXYzine hydrochloride 50 milliGRAM(s) Oral every 6 hours PRN Anxiety  magnesium hydroxide Suspension 30 milliLiter(s) Oral daily PRN Constipation  traZODone 50 milliGRAM(s) Oral at bedtime PRN insomnia

## 2023-08-06 NOTE — BH INPATIENT PSYCHIATRY PROGRESS NOTE - NSBHMSEPERCEPT_PSY_A_CORE
patient clearly responding to internal stimuli in the form of auditory hallucinations/Auditory hallucinations

## 2023-08-06 NOTE — BH INPATIENT PSYCHIATRY PROGRESS NOTE - NSBHMETABOLIC_PSY_ALL_CORE_FT
BMI: BMI (kg/m2): 27.1 (07-20-23 @ 09:50)  HbA1c:   Glucose:   BP: 112/73 (08-05-23 @ 12:15) (107/67 - 112/73)  Lipid Panel:

## 2023-08-07 VITALS
TEMPERATURE: 98 F | DIASTOLIC BLOOD PRESSURE: 85 MMHG | SYSTOLIC BLOOD PRESSURE: 129 MMHG | HEART RATE: 91 BPM | OXYGEN SATURATION: 98 %

## 2023-08-07 PROCEDURE — 90832 PSYTX W PT 30 MINUTES: CPT

## 2023-08-07 PROCEDURE — 99232 SBSQ HOSP IP/OBS MODERATE 35: CPT

## 2023-08-07 RX ORDER — CHLORPROMAZINE HCL 10 MG
100 TABLET ORAL
Refills: 0 | Status: DISCONTINUED | OUTPATIENT
Start: 2023-08-07 | End: 2023-08-08

## 2023-08-07 RX ORDER — CHLORPROMAZINE HCL 10 MG
100 TABLET ORAL ONCE
Refills: 0 | Status: COMPLETED | OUTPATIENT
Start: 2023-08-07 | End: 2023-08-07

## 2023-08-07 RX ADMIN — Medication 100 MILLIGRAM(S): at 11:16

## 2023-08-07 RX ADMIN — Medication 100 MILLIGRAM(S): at 21:12

## 2023-08-07 NOTE — BH INPATIENT PSYCHIATRY PROGRESS NOTE - NSBHMSESPABN_PSY_A_CORE
Decreased productivity
Loud volume
Loud volume
Decreased productivity
Loud volume

## 2023-08-07 NOTE — BH INPATIENT PSYCHIATRY PROGRESS NOTE - NSBHMSEAFFQUAL_PSY_A_CORE
Depressed
Irritable
Irritable
Anxious
Depressed
Irritable
Irritable
Anxious
Irritable
Depressed
Irritable

## 2023-08-07 NOTE — BH INPATIENT PSYCHIATRY PROGRESS NOTE - NSTXPSYCHODATETRGT_PSY_ALL_CORE
02-Aug-2023
26-Jul-2023
02-Aug-2023
09-Aug-2023
26-Jul-2023
26-Jul-2023
09-Aug-2023
02-Aug-2023
09-Aug-2023

## 2023-08-07 NOTE — BH INPATIENT PSYCHIATRY PROGRESS NOTE - NSBHMSEMOVE_PSY_A_CORE
patient shaking his head at times, keeps head down/No abnormal movements
patient shaking his head at times, keeps head down/Tics/Tremors
patient shaking his head at times, keeps head down/Tics/Tremors
patient shaking his head at times, keeps head down/No abnormal movements
patient shaking his head at times, keeps head down/No abnormal movements
patient shaking his head at times, keeps head down/Tics/Tremors
patient shaking his head at times, keeps head down/No abnormal movements
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
## 2023-08-07 NOTE — BH INPATIENT PSYCHIATRY PROGRESS NOTE - NSBHPSYCHOLCOGORIENT_PSY_A_CORE
Unable to assess
Oriented to time, place, person, situation
Unable to assess
Unable to assess
Oriented to time, place, person, situation
Unable to assess
Unable to assess
Not fully oriented...
Not fully oriented...
Oriented to time, place, person, situation
Not fully oriented...

## 2023-08-07 NOTE — BH INPATIENT PSYCHIATRY PROGRESS NOTE - NSTXPSYCHOPROGRES_PSY_ALL_CORE
No Change
Improving
No Change
Improving
No Change

## 2023-08-07 NOTE — BH PSYCHOLOGY - CLINICIAN PSYCHOTHERAPY NOTE - TOKEN PULL-DIAGNOSIS
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

## 2023-08-07 NOTE — BH INPATIENT PSYCHIATRY PROGRESS NOTE - NSBHMSETHTPROC_PSY_A_CORE
Linear
Linear/Normal reasoning
Linear
Disorganized/Impaired reasoning
Disorganized/Thought blocking/Impaired reasoning
Linear
Disorganized/Illogical/Impaired reasoning
Linear/Normal reasoning
Disorganized/Illogical/Impaired reasoning
Disorganized/Illogical/Impaired reasoning
Linear/Normal reasoning
Linear
Disorganized/Illogical/Impaired reasoning

## 2023-08-07 NOTE — BH INPATIENT PSYCHIATRY PROGRESS NOTE - NSTXSUICIDDATETRGT_PSY_ALL_CORE
04-Aug-2023
04-Aug-2023
28-Jul-2023
04-Aug-2023
11-Aug-2023
28-Jul-2023
11-Aug-2023
04-Aug-2023
28-Jul-2023
28-Jul-2023
04-Aug-2023
04-Aug-2023
28-Jul-2023

## 2023-08-07 NOTE — BH INPATIENT PSYCHIATRY PROGRESS NOTE - NSDCCRITERIA_PSY_ALL_CORE
Improvement of s/s

## 2023-08-07 NOTE — BH INPATIENT PSYCHIATRY PROGRESS NOTE - NSBHCHARTREVIEWVS_PSY_A_CORE FT
Vital Signs Last 24 Hrs  T(C): 36.5 (08-07-23 @ 09:05), Max: 36.5 (08-07-23 @ 09:05)  T(F): 97.7 (08-07-23 @ 09:05), Max: 97.7 (08-07-23 @ 09:05)  HR: 85 (08-07-23 @ 09:05) (85 - 85)  BP: 137/83 (08-07-23 @ 09:05) (137/83 - 137/83)  BP(mean): --  RR: 18 (08-07-23 @ 09:05) (18 - 18)  SpO2: 98% (08-07-23 @ 09:05) (98% - 98%)

## 2023-08-07 NOTE — BH INPATIENT PSYCHIATRY PROGRESS NOTE - NSTXVIOLNTGOAL_PSY_ALL_CORE
Will identify 2 situations that cause an aggressive response

## 2023-08-07 NOTE — BH INPATIENT PSYCHIATRY PROGRESS NOTE - NSBHMSEAFFCONG_PSY_A_CORE
Congruent
Unable to assess
Congruent

## 2023-08-07 NOTE — BH INPATIENT PSYCHIATRY PROGRESS NOTE - NSICDXBHPRIMARYDX_PSY_ALL_CORE
Psychosis, schizophrenia, simple   F20.89  
Paranoid schizophrenia   F20.0  
Psychosis, schizophrenia, simple   F20.89  
Psychosis, schizophrenia, simple   F20.89  
Paranoid schizophrenia   F20.0  
Psychosis, schizophrenia, simple   F20.89  
Paranoid schizophrenia   F20.0  
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
## 2023-08-07 NOTE — BH INPATIENT PSYCHIATRY PROGRESS NOTE - NSBHATTESTBILLONSITE_PSY_A_CORE
ILENE to bill

## 2023-08-07 NOTE — BH INPATIENT PSYCHIATRY PROGRESS NOTE - NSTXPSYCHOGOALOTHER_PSY_ALL_CORE
Pt. will participate in groups and milieu tx. structure of unit
Pt will participate in groups and in the milieu treatment structure of the unit
Pt. will participate in groups and milieu tx. structure of unit
Pt will participate in groups and in the milieu treatment structure of the unit
Pt. will participate in groups and milieu tx. structure of unit
Pt will participate in groups and in the milieu treatment structure of the unit

## 2023-08-07 NOTE — BH PSYCHOLOGY - CLINICIAN PSYCHOTHERAPY NOTE - NSBHPSYCHOLNARRATIVE_PSY_A_CORE FT
APPEARANCE:  [x] adequately groomed []disheveled  [] malodorous [] Other:     BEHAVIOR: [x] cooperative [] uncooperative [x] good EC [] poor EC [x] well related [] oddly related [] guarded []PMA [] PMR []abnormal movements [] Other: _____________     SPEED: [x] normal rate/rhythm/volume [] loud [] quiet [] slow  [] rapid [] pressured [] Other: _________   MOOD: [x] euthymic [] dysphoric []anxious [] irritable [] Other: ___________   AFFECT: [x] full [] expansive [] constricted [] blunted [] flat [] stable [] labile [] Other: _________________ THOUGHT PROCESS: [x] organized [] disorganized [] goal-directed [] concrete [] logical  [] illogical   [] circumstantial [] tangential [] impoverished [] effusive [] repetitive [] Other: ___________   THOUGHT CONTENT: [x] negative for delusions/suicidal ideation /homicidal ideation  [] positive for delusions/suicidal ideation/homicidal ideation Describe: _____________________________________   PERCEPTION: [x] negative for auditory/ visual hallucinations  [] positive for auditory/ visual hallucinations Describe: ____________________________________________________________   INSIGHT/JUDGMENT: [] good [x]fair [] poor        IMPULSE CONTROL: [x] good []fair [] poor         COGNITION: [x] alert and oriented to person,time,place    Lacks orientation to person/ time/ place. Describe: _______________________    Met with Mr. Centeno for CAMS assessment, he denied coming in with Suicidal ideation, reported that even before coming in to the hospital, he was not feeling suicidal. He reported that Suicidal ideation was something he struggled with, many years ago, and not something he deals with, right now.  APPEARANCE:  [x] adequately groomed []disheveled  [] malodorous [] Other:     BEHAVIOR: [x] cooperative [] uncooperative [x] good EC [] poor EC [x] well related [] oddly related [] guarded []PMA [] PMR []abnormal movements [] Other: _____________     SPEED: [x] normal rate/rhythm/volume [] loud [] quiet [] slow  [] rapid [] pressured [] Other: _________   MOOD: [x] euthymic [] dysphoric []anxious [] irritable [] Other: ___________   AFFECT: [x] full [] expansive [] constricted [] blunted [] flat [] stable [] labile [] Other: _________________ THOUGHT PROCESS: [x] organized [] disorganized [] goal-directed [] concrete [] logical  [] illogical   [] circumstantial [] tangential [] impoverished [] effusive [] repetitive [] Other: ___________   THOUGHT CONTENT: [x] negative for delusions/suicidal ideation /homicidal ideation  [] positive for delusions/suicidal ideation/homicidal ideation Describe: _____________________________________   PERCEPTION: [x] negative for auditory/ visual hallucinations  [] positive for auditory/ visual hallucinations Describe: ____________________________________________________________   INSIGHT/JUDGMENT: [] good [x]fair [] poor        IMPULSE CONTROL: [x] good []fair [] poor         COGNITION: [x] alert and oriented to person,time,place    Lacks orientation to person/ time/ place. Describe: _______________________    Met with Mr. Centeno for CAMS assessment, he denied coming in with Suicidal ideation, reported that even before coming in to the hospital, he was not feeling suicidal. He reported that Suicidal ideation was something he struggled with, many years ago, and not something he deals with, right now. He reported low scores on all items of Drivers of suicidality, psych pain is about  not being able to leave, stress is about "disagreeing with DR's and SW's?, agitation is - he completely denied it being related to suicidality, denied any hopelessness, and self hate is denied but reported that he does not like that he does not have his front teeth missing. He reported that he is not at risk for suicide. Reported multiple reasons for living, and denied any reason for dying. WIsh to live is very much, and wish to die is not at all. One thing that would help him no longer feel suicidal is denied, and he reported " I don't feel suicidal". When asked about what he needs upon discharged, he reported that he needs transportation, denied any other needs.

## 2023-08-07 NOTE — BH INPATIENT PSYCHIATRY PROGRESS NOTE - NSBHMSEINTELL_PSY_A_CORE
Average
Unable to assess
Average
Unable to assess
Average
Unable to assess
Average

## 2023-08-07 NOTE — BH INPATIENT PSYCHIATRY PROGRESS NOTE - NSBHCONSBHPROVDETAILS_PSY_A_CORE  FT
Team spoke with his ACT team  for collateral

## 2023-08-07 NOTE — BH INPATIENT PSYCHIATRY PROGRESS NOTE - NSICDXBHSECONDARYDX_PSY_ALL_CORE
Post traumatic stress disorder (PTSD)   F43.10  MDD (major depressive disorder)   F32.9  Paranoid schizophrenia   F20.0  
Psychosis, unspecified psychosis type   F29  MDD (major depressive disorder)   F32.9  Mood disorder   F39  Paranoid schizophrenia   F20.0  Post traumatic stress disorder (PTSD)   F43.10  
Post traumatic stress disorder (PTSD)   F43.10  MDD (major depressive disorder)   F32.9  Paranoid schizophrenia   F20.0  Use of cannabis   F12.90  
Post traumatic stress disorder (PTSD)   F43.10  MDD (major depressive disorder)   F32.9  Paranoid schizophrenia   F20.0  Use of cannabis   F12.90  
Post traumatic stress disorder (PTSD)   F43.10  MDD (major depressive disorder)   F32.9  Paranoid schizophrenia   F20.0  
Post traumatic stress disorder (PTSD)   F43.10  MDD (major depressive disorder)   F32.9  Paranoid schizophrenia   F20.0  Use of cannabis   F12.90  
Post traumatic stress disorder (PTSD)   F43.10  MDD (major depressive disorder)   F32.9  Paranoid schizophrenia   F20.0  Use of cannabis   F12.90

## 2023-08-07 NOTE — BH INPATIENT PSYCHIATRY PROGRESS NOTE - CURRENT MEDICATION
MEDICATIONS  (STANDING):  chlorproMAZINE    Injectable 100 milliGRAM(s) IntraMuscular once  chlorproMAZINE    Tablet 100 milliGRAM(s) Oral two times a day  valproic  acid Syrup 750 milliGRAM(s) Oral two times a day    MEDICATIONS  (PRN):  acetaminophen     Tablet .. 650 milliGRAM(s) Oral every 6 hours PRN Temp greater or equal to 38C (100.4F), Mild Pain (1 - 3)  aluminum hydroxide/magnesium hydroxide/simethicone Suspension 30 milliLiter(s) Oral every 4 hours PRN Dyspepsia  benztropine 1 milliGRAM(s) Oral daily PRN eps  chlorproMAZINE    Tablet 100 milliGRAM(s) Oral every 4 hours PRN agitation  clonazePAM  Tablet 2 milliGRAM(s) Oral three times a day PRN agitation  hydrOXYzine hydrochloride 50 milliGRAM(s) Oral every 6 hours PRN Anxiety  magnesium hydroxide Suspension 30 milliLiter(s) Oral daily PRN Constipation  traZODone 50 milliGRAM(s) Oral at bedtime PRN insomnia

## 2023-08-07 NOTE — BH INPATIENT PSYCHIATRY PROGRESS NOTE - NSBHMSEREMMEM_PSY_A_CORE
Normal
Unable to assess
Normal

## 2023-08-07 NOTE — BH INPATIENT PSYCHIATRY PROGRESS NOTE - NSTXSUICIDDATEEST_PSY_ALL_CORE
21-Jul-2023
21-Jul-2023
04-Aug-2023
21-Jul-2023
04-Aug-2023
21-Jul-2023

## 2023-08-07 NOTE — BH INPATIENT PSYCHIATRY PROGRESS NOTE - PRN MEDS
MEDICATIONS  (PRN):  acetaminophen     Tablet .. 650 milliGRAM(s) Oral every 6 hours PRN Temp greater or equal to 38C (100.4F), Mild Pain (1 - 3)  aluminum hydroxide/magnesium hydroxide/simethicone Suspension 30 milliLiter(s) Oral every 4 hours PRN Dyspepsia  benztropine 1 milliGRAM(s) Oral daily PRN eps  chlorproMAZINE    Tablet 100 milliGRAM(s) Oral every 4 hours PRN agitation  clonazePAM  Tablet 2 milliGRAM(s) Oral three times a day PRN agitation  hydrOXYzine hydrochloride 50 milliGRAM(s) Oral every 6 hours PRN Anxiety  magnesium hydroxide Suspension 30 milliLiter(s) Oral daily PRN Constipation  traZODone 50 milliGRAM(s) Oral at bedtime PRN insomnia

## 2023-08-07 NOTE — BH INPATIENT PSYCHIATRY PROGRESS NOTE - NSTXPROBVIOLNT_PSY_ALL_CORE
VIOLENT/AGGRESSIVE BEHAVIOR

## 2023-08-07 NOTE — BH INPATIENT PSYCHIATRY PROGRESS NOTE - NSBHMSEATTEN_PSY_A_CORE
Normal
Impaired
Normal
Impaired
Impaired
Normal
Impaired
Normal
Impaired
Normal
Impaired
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
## 2023-08-07 NOTE — BH INPATIENT PSYCHIATRY PROGRESS NOTE - NSTXSUICIDGOALOTHER_PSY_ALL_CORE
Pt will stabilize mood and alleviate symptoms of SI to engage in discharge planning.

## 2023-08-07 NOTE — BH INPATIENT PSYCHIATRY PROGRESS NOTE - NSBHATTESTATTENDNAMEFT_PSY_A_CORE
Constantino Holt
Jude Whitehead
Constantino Holt

## 2023-08-07 NOTE — BH INPATIENT PSYCHIATRY PROGRESS NOTE - NSTXPSYCHODATEEST_PSY_ALL_CORE
02-Aug-2023
26-Jul-2023
26-Jul-2023
21-Jul-2023
26-Jul-2023
02-Aug-2023
21-Jul-2023
02-Aug-2023
26-Jul-2023
21-Jul-2023
02-Aug-2023
02-Aug-2023
26-Jul-2023

## 2023-08-07 NOTE — BH INPATIENT PSYCHIATRY PROGRESS NOTE - NSBHMETABOLIC_PSY_ALL_CORE_FT
BMI: BMI (kg/m2): 27.1 (07-20-23 @ 09:50)  HbA1c:   Glucose:   BP: 137/83 (08-07-23 @ 09:05) (107/67 - 137/83)  Lipid Panel:
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
## 2023-08-07 NOTE — BH INPATIENT PSYCHIATRY PROGRESS NOTE - NSBHMSEKNOW_PSY_A_CORE
Normal
Unable to assess
Normal

## 2023-08-07 NOTE — BH INPATIENT PSYCHIATRY PROGRESS NOTE - NSTXSUICIDINTERMD_PSY_ALL_CORE
Psychopharmacology x15 minutes

## 2023-08-07 NOTE — BH INPATIENT PSYCHIATRY PROGRESS NOTE - ORIENTATION
Pt appeared to now appreciate today was Saturday. 

## 2023-08-08 PROCEDURE — 99238 HOSP IP/OBS DSCHRG MGMT 30/<: CPT

## 2023-08-08 PROCEDURE — 96372 THER/PROPH/DIAG INJ SC/IM: CPT | Mod: XU

## 2023-08-08 PROCEDURE — 36415 COLL VENOUS BLD VENIPUNCTURE: CPT

## 2023-08-08 PROCEDURE — 87635 SARS-COV-2 COVID-19 AMP PRB: CPT

## 2023-08-08 PROCEDURE — 99285 EMERGENCY DEPT VISIT HI MDM: CPT | Mod: 25

## 2023-08-08 PROCEDURE — 85025 COMPLETE CBC W/AUTO DIFF WBC: CPT

## 2023-08-08 PROCEDURE — 80307 DRUG TEST PRSMV CHEM ANLYZR: CPT

## 2023-08-08 PROCEDURE — 80048 BASIC METABOLIC PNL TOTAL CA: CPT

## 2023-08-08 RX ORDER — CHLORPROMAZINE HCL 10 MG
1 TABLET ORAL
Qty: 28 | Refills: 0
Start: 2023-08-08 | End: 2023-08-21

## 2023-08-08 RX ORDER — CHLORPROMAZINE HCL 10 MG
1 TABLET ORAL
Qty: 0 | Refills: 0 | DISCHARGE
Start: 2023-08-08

## 2023-08-08 RX ADMIN — Medication 100 MILLIGRAM(S): at 10:53

## 2023-08-08 NOTE — BH INPATIENT PSYCHIATRY DISCHARGE NOTE - NSBHMETABOLIC_PSY_ALL_CORE_FT
BMI: BMI (kg/m2): 27.1 (07-20-23 @ 09:50)  HbA1c:   Glucose:   BP: 129/85 (08-07-23 @ 16:56) (112/73 - 137/83)  Lipid Panel:

## 2023-08-08 NOTE — BH DISCHARGE NOTE NURSING/SOCIAL WORK/PSYCH REHAB - NSDCPRGOAL_PSY_ALL_CORE
Pt attended select groups during admission.  Pt's mood and behavior fluctuated significantly throughout admission.  In the first week, pt was mostly calm and able to maintain behavioral control, though oddly-related at times.  In the last two weeks, pt has been verbally and physically aggressive and violent and threatening on several occasions.  Pt has demonstrated poor frustration tolerance and poor impulse control.  Pt has been discharge-focused and wanting to leave the hospital.  In the last three days, pt has been more able to be redirected and has maintained better behavioral control.  Pt endorsed readiness for discharge and completed a safety plan.

## 2023-08-08 NOTE — BH DISCHARGE NOTE NURSING/SOCIAL WORK/PSYCH REHAB - PATIENT PORTAL LINK FT
You can access the FollowMyHealth Patient Portal offered by [facility] by registering at the following website: [email or web address]. By joining [facility]’s FollowMyHealth portal, you will also be able to view your health information using other applications (apps) compatible with our system.
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
## 2023-08-08 NOTE — BH INPATIENT PSYCHIATRY DISCHARGE NOTE - NSACTIVEVENT_PSY_ALL_CORE
Triggering events leading to humiliation, shame, and/or despair (e.g., Loss of relationship, financial or health status) (real or anticipated)/Legal problems/Pending incarceration or homelessness

## 2023-08-08 NOTE — BH DISCHARGE NOTE NURSING/SOCIAL WORK/PSYCH REHAB - NSCDUDCCRISIS_PSY_A_CORE
[facility]  1 [phone]) [facility] ([phone])  Text "WELL" to 46684  Website: [email or web address]/.National Suicide Prevention Lifeline 4 (314) 637-2046/.  [employer]  1 (293) LIFENET (082-5360)/988 Suicide and Crisis Lifeline

## 2023-08-08 NOTE — BH INPATIENT PSYCHIATRY DISCHARGE NOTE - NSBHFUPINTERVALHXFT_PSY_A_CORE
Patient in behavioral control, has no complaints, discharge focused. Compliant with thorazine 100mg bid- sent to preferred pharmacy. No si/hi/avh or PI endorsed
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
## 2023-08-08 NOTE — BH INPATIENT PSYCHIATRY DISCHARGE NOTE - VIOLENCE RISK FACTORS:
History of violence prior to age 18/Antisocial behavior/cognition (past or present)/Violent ideation/threat/speech/Substance abuse/Affective dysregulation/Impulsivity/History of being victimized/traumatized/Lack of insight into violence risk/need for treatment/Noncompliance with treatment/Community stressors that increase the risk of destabilization/History of violation of a legal mandate (e.g., parole, probation, AOT)

## 2023-08-08 NOTE — BH INPATIENT PSYCHIATRY DISCHARGE NOTE - NSDCCPCAREPLAN_GEN_ALL_CORE_FT
PRINCIPAL DISCHARGE DIAGNOSIS  Diagnosis: Paranoid schizophrenia  Assessment and Plan of Treatment: Continue current medication regimen and follow up with aftercare appointments      SECONDARY DISCHARGE DIAGNOSES  Diagnosis: Post traumatic stress disorder (PTSD)  Assessment and Plan of Treatment: Continue current medication regimen and follow up with aftercare appointments    Diagnosis: Antisocial personality disorder  Assessment and Plan of Treatment: Continue current medication regimen and follow up with aftercare appointments    Diagnosis: Mood disorder  Assessment and Plan of Treatment: Continue current medication regimen and follow up with aftercare appointments    Diagnosis: Cannabis use disorder  Assessment and Plan of Treatment: Continue current medication regimen and follow up with aftercare appointments    Diagnosis: Substance induced mood disorder  Assessment and Plan of Treatment: Continue current medication regimen and follow up with aftercare appointments

## 2023-08-09 NOTE — BH SOCIAL WORK CONFIRMATION FOLLOW UP NOTE - NSCOMMENTS_PSY_ALL_CORE
Caring Call 8/9/23: Outreach attempt made x 3. Pt not available. Writer left a  requesting a call back.     Linkage call 8/9/23. Writer email agency to request update on whether pt attended appt at 1 pm. Writer awaiting on update.     09-Aug-2023 13:00  [address], [address]  [phone]

## 2023-08-10 DIAGNOSIS — F19.951 OTHER PSYCHOACTIVE SUBSTANCE USE, UNSPECIFIED WITH PSYCHOACTIVE SUBSTANCE-INDUCED PSYCHOTIC DISORDER WITH HALLUCINATIONS: ICD-10-CM

## 2023-08-10 DIAGNOSIS — F20.0 PARANOID SCHIZOPHRENIA: ICD-10-CM

## 2023-08-10 DIAGNOSIS — F32.9 MAJOR DEPRESSIVE DISORDER, SINGLE EPISODE, UNSPECIFIED: ICD-10-CM

## 2023-08-10 DIAGNOSIS — F25.9 SCHIZOAFFECTIVE DISORDER, UNSPECIFIED: ICD-10-CM

## 2023-08-10 DIAGNOSIS — F12.90 CANNABIS USE, UNSPECIFIED, UNCOMPLICATED: ICD-10-CM

## 2023-08-10 DIAGNOSIS — R45.851 SUICIDAL IDEATIONS: ICD-10-CM

## 2023-08-10 DIAGNOSIS — F39 UNSPECIFIED MOOD [AFFECTIVE] DISORDER: ICD-10-CM

## 2023-08-10 DIAGNOSIS — F60.9 PERSONALITY DISORDER, UNSPECIFIED: ICD-10-CM

## 2023-08-10 DIAGNOSIS — F43.10 POST-TRAUMATIC STRESS DISORDER, UNSPECIFIED: ICD-10-CM

## 2025-07-03 NOTE — BH PATIENT PROFILE - NSDASAUNWILLING_PSY_ALL_CORE
_________________________________________________________________________________________________    Patient name: Jodi Arroyo  YOB: 1970  Encounter date: 07/02/Eloy Oglesby MD 07/02/2025 08:00 AM    
Yes